# Patient Record
Sex: FEMALE | Race: BLACK OR AFRICAN AMERICAN | NOT HISPANIC OR LATINO | Employment: OTHER | URBAN - METROPOLITAN AREA
[De-identification: names, ages, dates, MRNs, and addresses within clinical notes are randomized per-mention and may not be internally consistent; named-entity substitution may affect disease eponyms.]

---

## 2023-06-01 LAB
ALANINE AMINOTRANSFERASE (SGPT) (U/L) IN SER/PLAS: 12 U/L (ref 7–45)
ALBUMIN (G/DL) IN SER/PLAS: 3.8 G/DL (ref 3.4–5)
ALBUMIN (MG/L) IN URINE: 1709.4 MG/L
ALBUMIN/CREATININE (UG/MG) IN URINE: 2128.8 UG/MG CRT (ref 0–30)
ALKALINE PHOSPHATASE (U/L) IN SER/PLAS: 60 U/L (ref 33–136)
ANION GAP IN SER/PLAS: 11 MMOL/L (ref 10–20)
APPEARANCE, URINE: CLEAR
ASPARTATE AMINOTRANSFERASE (SGOT) (U/L) IN SER/PLAS: 18 U/L (ref 9–39)
BACTERIA, URINE: ABNORMAL /HPF
BASOPHILS (10*3/UL) IN BLOOD BY AUTOMATED COUNT: 0.05 X10E9/L (ref 0–0.1)
BASOPHILS/100 LEUKOCYTES IN BLOOD BY AUTOMATED COUNT: 1.1 % (ref 0–2)
BILIRUBIN TOTAL (MG/DL) IN SER/PLAS: 0.4 MG/DL (ref 0–1.2)
BILIRUBIN, URINE: NEGATIVE
BLOOD, URINE: NEGATIVE
BUDDING YEAST, URINE: PRESENT /HPF
CALCIDIOL (25 OH VITAMIN D3) (NG/ML) IN SER/PLAS: 44 NG/ML
CALCIUM (MG/DL) IN SER/PLAS: 10.1 MG/DL (ref 8.6–10.6)
CARBON DIOXIDE, TOTAL (MMOL/L) IN SER/PLAS: 26 MMOL/L (ref 21–32)
CHLORIDE (MMOL/L) IN SER/PLAS: 107 MMOL/L (ref 98–107)
CHOLESTEROL (MG/DL) IN SER/PLAS: 234 MG/DL (ref 0–199)
CHOLESTEROL IN HDL (MG/DL) IN SER/PLAS: 55.3 MG/DL
CHOLESTEROL/HDL RATIO: 4.2
COLOR, URINE: YELLOW
CREATININE (MG/DL) IN SER/PLAS: 1.46 MG/DL (ref 0.5–1.05)
CREATININE (MG/DL) IN URINE: 80.3 MG/DL (ref 20–320)
EOSINOPHILS (10*3/UL) IN BLOOD BY AUTOMATED COUNT: 0.11 X10E9/L (ref 0–0.4)
EOSINOPHILS/100 LEUKOCYTES IN BLOOD BY AUTOMATED COUNT: 2.4 % (ref 0–6)
ERYTHROCYTE DISTRIBUTION WIDTH (RATIO) BY AUTOMATED COUNT: 13.9 % (ref 11.5–14.5)
ERYTHROCYTE MEAN CORPUSCULAR HEMOGLOBIN CONCENTRATION (G/DL) BY AUTOMATED: 31.6 G/DL (ref 32–36)
ERYTHROCYTE MEAN CORPUSCULAR VOLUME (FL) BY AUTOMATED COUNT: 101 FL (ref 80–100)
ERYTHROCYTES (10*6/UL) IN BLOOD BY AUTOMATED COUNT: 3.87 X10E12/L (ref 4–5.2)
ESTIMATED AVERAGE GLUCOSE FOR HBA1C: 128 MG/DL
GFR FEMALE: 35 ML/MIN/1.73M2
GLUCOSE (MG/DL) IN SER/PLAS: 109 MG/DL (ref 74–99)
GLUCOSE, URINE: NEGATIVE MG/DL
HEMATOCRIT (%) IN BLOOD BY AUTOMATED COUNT: 39.2 % (ref 36–46)
HEMOGLOBIN (G/DL) IN BLOOD: 12.4 G/DL (ref 12–16)
HEMOGLOBIN A1C/HEMOGLOBIN TOTAL IN BLOOD: 6.1 %
IMMATURE GRANULOCYTES/100 LEUKOCYTES IN BLOOD BY AUTOMATED COUNT: 0.2 % (ref 0–0.9)
KETONES, URINE: NEGATIVE MG/DL
LDL: 151 MG/DL (ref 0–99)
LEUKOCYTE ESTERASE, URINE: ABNORMAL
LEUKOCYTES (10*3/UL) IN BLOOD BY AUTOMATED COUNT: 4.5 X10E9/L (ref 4.4–11.3)
LYMPHOCYTES (10*3/UL) IN BLOOD BY AUTOMATED COUNT: 2.18 X10E9/L (ref 0.8–3)
LYMPHOCYTES/100 LEUKOCYTES IN BLOOD BY AUTOMATED COUNT: 48.6 % (ref 13–44)
MONOCYTES (10*3/UL) IN BLOOD BY AUTOMATED COUNT: 0.42 X10E9/L (ref 0.05–0.8)
MONOCYTES/100 LEUKOCYTES IN BLOOD BY AUTOMATED COUNT: 9.4 % (ref 2–10)
MUCUS, URINE: ABNORMAL /LPF
NEUTROPHILS (10*3/UL) IN BLOOD BY AUTOMATED COUNT: 1.72 X10E9/L (ref 1.6–5.5)
NEUTROPHILS/100 LEUKOCYTES IN BLOOD BY AUTOMATED COUNT: 38.3 % (ref 40–80)
NITRITE, URINE: NEGATIVE
NRBC (PER 100 WBCS) BY AUTOMATED COUNT: 0 /100 WBC (ref 0–0)
PH, URINE: 6 (ref 5–8)
PLATELETS (10*3/UL) IN BLOOD AUTOMATED COUNT: 154 X10E9/L (ref 150–450)
POTASSIUM (MMOL/L) IN SER/PLAS: 4.3 MMOL/L (ref 3.5–5.3)
PROTEIN TOTAL: 7.3 G/DL (ref 6.4–8.2)
PROTEIN, URINE: ABNORMAL MG/DL
RBC, URINE: <1 /HPF (ref 0–5)
SODIUM (MMOL/L) IN SER/PLAS: 140 MMOL/L (ref 136–145)
SPECIFIC GRAVITY, URINE: 1.01 (ref 1–1.03)
SQUAMOUS EPITHELIAL CELLS, URINE: 3 /HPF
THYROTROPIN (MIU/L) IN SER/PLAS BY DETECTION LIMIT <= 0.05 MIU/L: 6.92 MIU/L (ref 0.44–3.98)
THYROXINE (T4) FREE (NG/DL) IN SER/PLAS: 1.17 NG/DL (ref 0.78–1.48)
TRIGLYCERIDE (MG/DL) IN SER/PLAS: 138 MG/DL (ref 0–149)
UREA NITROGEN (MG/DL) IN SER/PLAS: 28 MG/DL (ref 6–23)
UROBILINOGEN, URINE: <2 MG/DL (ref 0–1.9)
VLDL: 28 MG/DL (ref 0–40)
WBC, URINE: 6 /HPF (ref 0–5)

## 2023-10-21 RX ORDER — ALLOPURINOL 100 MG/1
150 TABLET ORAL DAILY
Qty: 135 TABLET | Refills: 3 | OUTPATIENT
Start: 2023-10-21

## 2023-10-21 RX ORDER — LEVOTHYROXINE SODIUM 125 UG/1
125 TABLET ORAL DAILY
Qty: 90 TABLET | Refills: 3 | OUTPATIENT
Start: 2023-10-21

## 2023-10-21 RX ORDER — ATENOLOL 25 MG/1
25 TABLET ORAL DAILY
Qty: 90 TABLET | Refills: 3 | OUTPATIENT
Start: 2023-10-21

## 2023-11-03 PROBLEM — Z86.69 HISTORY OF GLAUCOMA: Status: ACTIVE | Noted: 2023-11-03

## 2023-11-03 PROBLEM — R27.9 COORDINATION PROBLEM: Status: ACTIVE | Noted: 2019-09-06

## 2023-11-03 PROBLEM — E66.811 CLASS 1 OBESITY: Status: ACTIVE | Noted: 2022-06-08

## 2023-11-03 PROBLEM — E03.9 HYPOTHYROIDISM: Status: ACTIVE | Noted: 2022-06-20

## 2023-11-03 PROBLEM — Z94.7 HISTORY OF CORNEA TRANSPLANT: Status: ACTIVE | Noted: 2017-09-06

## 2023-11-03 PROBLEM — H40.60X0: Status: ACTIVE | Noted: 2019-01-05

## 2023-11-03 PROBLEM — J30.0 VASOMOTOR RHINITIS: Status: ACTIVE | Noted: 2023-11-03

## 2023-11-03 PROBLEM — M10.9 GOUT: Status: ACTIVE | Noted: 2022-06-20

## 2023-11-03 PROBLEM — K21.9 GASTROESOPHAGEAL REFLUX DISEASE: Status: ACTIVE | Noted: 2022-06-20

## 2023-11-03 PROBLEM — E66.9 CLASS 1 OBESITY: Status: ACTIVE | Noted: 2022-06-08

## 2023-11-03 PROBLEM — J34.2 DEVIATED SEPTUM: Status: ACTIVE | Noted: 2023-11-03

## 2023-11-03 PROBLEM — R04.0 EPISTAXIS, RECURRENT: Status: ACTIVE | Noted: 2023-11-03

## 2023-11-03 PROBLEM — L82.1 OTHER SEBORRHEIC KERATOSIS: Status: ACTIVE | Noted: 2019-06-20

## 2023-11-03 PROBLEM — H35.352 CYSTOID MACULAR EDEMA OF LEFT EYE: Status: ACTIVE | Noted: 2017-09-06

## 2023-11-03 PROBLEM — T38.0X5A: Status: ACTIVE | Noted: 2019-01-05

## 2023-11-03 PROBLEM — I10 HYPERTENSIVE DISORDER: Status: ACTIVE | Noted: 2022-06-20

## 2023-11-03 PROBLEM — N12 PYELONEPHRITIS: Status: ACTIVE | Noted: 2021-07-29

## 2023-11-03 RX ORDER — ASPIRIN 81 MG/1
1 TABLET ORAL DAILY
COMMUNITY
Start: 2016-02-22

## 2023-11-03 RX ORDER — MULTIVITAMIN/IRON/FOLIC ACID 18MG-0.4MG
TABLET ORAL
COMMUNITY
End: 2023-11-06 | Stop reason: WASHOUT

## 2023-11-03 RX ORDER — GABAPENTIN 100 MG/1
CAPSULE ORAL
COMMUNITY
Start: 2023-09-18 | End: 2023-11-16

## 2023-11-03 RX ORDER — ATENOLOL 25 MG/1
TABLET ORAL
COMMUNITY
Start: 2023-08-23 | End: 2023-11-16 | Stop reason: SDUPTHER

## 2023-11-03 RX ORDER — CARBOXYMETHYLCELLULOSE SODIUM 5 MG/ML
SOLUTION/ DROPS OPHTHALMIC
COMMUNITY

## 2023-11-03 RX ORDER — AMLODIPINE BESYLATE 5 MG/1
TABLET ORAL
COMMUNITY
Start: 2022-03-31 | End: 2023-11-16

## 2023-11-03 RX ORDER — PREDNISOLONE ACETATE 10 MG/ML
SUSPENSION/ DROPS OPHTHALMIC
COMMUNITY

## 2023-11-03 RX ORDER — HYDROCHLOROTHIAZIDE 25 MG/1
1 TABLET ORAL DAILY
COMMUNITY
Start: 2016-02-22 | End: 2023-11-06 | Stop reason: WASHOUT

## 2023-11-03 RX ORDER — GLUCOSAM/CHONDRO/HERB 149/HYAL 750-100 MG
TABLET ORAL
COMMUNITY

## 2023-11-03 RX ORDER — ACETAMINOPHEN 500 MG
TABLET ORAL
COMMUNITY
Start: 2022-06-20

## 2023-11-03 RX ORDER — FUROSEMIDE 40 MG/1
TABLET ORAL
COMMUNITY
Start: 2023-09-18 | End: 2023-11-16

## 2023-11-03 RX ORDER — VITAMIN E 268 MG
CAPSULE ORAL
COMMUNITY
Start: 2022-06-23

## 2023-11-03 RX ORDER — ALLOPURINOL 100 MG/1
TABLET ORAL
COMMUNITY
Start: 2016-08-19 | End: 2023-11-16 | Stop reason: SDUPTHER

## 2023-11-03 RX ORDER — LEVOTHYROXINE SODIUM 100 UG/1
CAPSULE ORAL
COMMUNITY
End: 2023-11-16 | Stop reason: SDUPTHER

## 2023-11-03 RX ORDER — TIMOLOL MALEATE 5 MG/ML
SOLUTION/ DROPS OPHTHALMIC
COMMUNITY
Start: 2021-05-28

## 2023-11-03 RX ORDER — ATENOLOL 50 MG/1
1 TABLET ORAL DAILY
COMMUNITY
Start: 2021-05-28 | End: 2023-11-16 | Stop reason: WASHOUT

## 2023-11-03 RX ORDER — CHOLECALCIFEROL (VITAMIN D3) 50 MCG
TABLET ORAL
COMMUNITY
Start: 2021-05-28

## 2023-11-06 ENCOUNTER — OFFICE VISIT (OUTPATIENT)
Dept: PRIMARY CARE | Facility: CLINIC | Age: 83
End: 2023-11-06
Payer: MEDICARE

## 2023-11-06 VITALS
WEIGHT: 224.8 LBS | DIASTOLIC BLOOD PRESSURE: 80 MMHG | OXYGEN SATURATION: 98 % | BODY MASS INDEX: 33.2 KG/M2 | HEART RATE: 59 BPM | SYSTOLIC BLOOD PRESSURE: 140 MMHG | TEMPERATURE: 97.3 F

## 2023-11-06 DIAGNOSIS — Z23 NEEDS FLU SHOT: ICD-10-CM

## 2023-11-06 DIAGNOSIS — E03.9 HYPOTHYROIDISM, UNSPECIFIED TYPE: ICD-10-CM

## 2023-11-06 DIAGNOSIS — K21.9 GASTROESOPHAGEAL REFLUX DISEASE WITHOUT ESOPHAGITIS: ICD-10-CM

## 2023-11-06 DIAGNOSIS — M10.9 GOUT, UNSPECIFIED CAUSE, UNSPECIFIED CHRONICITY, UNSPECIFIED SITE: ICD-10-CM

## 2023-11-06 DIAGNOSIS — I10 ESSENTIAL (PRIMARY) HYPERTENSION: Primary | ICD-10-CM

## 2023-11-06 DIAGNOSIS — N18.31 STAGE 3A CHRONIC KIDNEY DISEASE (CKD) (MULTI): ICD-10-CM

## 2023-11-06 PROCEDURE — 3079F DIAST BP 80-89 MM HG: CPT | Performed by: INTERNAL MEDICINE

## 2023-11-06 PROCEDURE — 99213 OFFICE O/P EST LOW 20 MIN: CPT | Performed by: INTERNAL MEDICINE

## 2023-11-06 PROCEDURE — G0008 ADMIN INFLUENZA VIRUS VAC: HCPCS | Performed by: INTERNAL MEDICINE

## 2023-11-06 PROCEDURE — 90662 IIV NO PRSV INCREASED AG IM: CPT | Performed by: INTERNAL MEDICINE

## 2023-11-06 PROCEDURE — 3077F SYST BP >= 140 MM HG: CPT | Performed by: INTERNAL MEDICINE

## 2023-11-06 PROCEDURE — 1159F MED LIST DOCD IN RCRD: CPT | Performed by: INTERNAL MEDICINE

## 2023-11-06 NOTE — PROGRESS NOTES
Subjective   Patient ID: Alejandra Nelson is a 83 y.o. female who presents for Follow-up (Going to [facility] - wants checked out).    HPI   Pt is going on a trip to [place].  Wants to make sure that she is ok to travel.  States that she feels well.  Voices no c/o    Review of Systems   Constitutional: Negative.    HENT: Negative.     Eyes: Negative.    Respiratory: Negative.     Cardiovascular: Negative.    Gastrointestinal: Negative.    Endocrine: Negative.    Genitourinary: Negative.    Musculoskeletal: Negative.    Skin: Negative.    Allergic/Immunologic: Negative.    Neurological: Negative.    Hematological: Negative.    Psychiatric/Behavioral: Negative.     All other systems reviewed and are negative.      Objective   /80   Pulse 59   Temp 36.3 °C (97.3 °F)   Wt 102 kg (224 lb 12.8 oz)   SpO2 98%   BMI 33.20 kg/m²     Physical Exam  Constitutional:       Appearance: Normal appearance.   HENT:      Head: Normocephalic and atraumatic.      Right Ear: Tympanic membrane and ear canal normal.      Left Ear: Tympanic membrane and ear canal normal.      Nose: Nose normal.      Mouth/Throat:      Mouth: Mucous membranes are moist.      Pharynx: Oropharynx is clear.   Cardiovascular:      Rate and Rhythm: Normal rate and regular rhythm.      Heart sounds: Normal heart sounds. No murmur heard.  Pulmonary:      Effort: Pulmonary effort is normal.      Breath sounds: Normal breath sounds.   Abdominal:      General: Bowel sounds are normal.      Palpations: Abdomen is soft.   Musculoskeletal:      Cervical back: Neck supple.      Thoracic back: Normal.      Lumbar back: Normal.      Comments: Cervical spine--limited rom   Neurological:      Mental Status: She is alert and oriented to person, place, and time.   Psychiatric:         Attention and Perception: Attention normal.         Mood and Affect: Mood and affect normal.         Assessment/Plan     .prob

## 2023-11-08 PROBLEM — N12 PYELONEPHRITIS: Status: RESOLVED | Noted: 2021-07-29 | Resolved: 2023-11-08

## 2023-11-08 PROBLEM — G62.9 POLYNEUROPATHY: Status: ACTIVE | Noted: 2023-11-08

## 2023-11-08 PROBLEM — M17.0 BILATERAL PRIMARY OSTEOARTHRITIS OF KNEE: Status: ACTIVE | Noted: 2023-11-08

## 2023-11-08 PROBLEM — R73.9 HYPERGLYCEMIA: Status: ACTIVE | Noted: 2023-11-08

## 2023-11-08 PROBLEM — M48.02 SPINAL STENOSIS, CERVICAL REGION: Status: ACTIVE | Noted: 2023-11-08

## 2023-11-08 PROBLEM — N18.31 STAGE 3A CHRONIC KIDNEY DISEASE (CKD) (MULTI): Status: ACTIVE | Noted: 2023-11-08

## 2023-11-08 ASSESSMENT — ENCOUNTER SYMPTOMS
NEUROLOGICAL NEGATIVE: 1
PSYCHIATRIC NEGATIVE: 1
RESPIRATORY NEGATIVE: 1
CONSTITUTIONAL NEGATIVE: 1
ALLERGIC/IMMUNOLOGIC NEGATIVE: 1
ENDOCRINE NEGATIVE: 1
HEMATOLOGIC/LYMPHATIC NEGATIVE: 1
CARDIOVASCULAR NEGATIVE: 1
GASTROINTESTINAL NEGATIVE: 1
EYES NEGATIVE: 1
MUSCULOSKELETAL NEGATIVE: 1

## 2023-11-12 DIAGNOSIS — M10.9 GOUT, UNSPECIFIED CAUSE, UNSPECIFIED CHRONICITY, UNSPECIFIED SITE: Primary | ICD-10-CM

## 2023-11-12 DIAGNOSIS — E03.9 HYPOTHYROIDISM, UNSPECIFIED TYPE: ICD-10-CM

## 2023-11-12 DIAGNOSIS — M48.00 SPINAL STENOSIS, UNSPECIFIED SPINAL REGION: ICD-10-CM

## 2023-11-12 DIAGNOSIS — I10 ESSENTIAL (PRIMARY) HYPERTENSION: ICD-10-CM

## 2023-11-16 RX ORDER — LEVOTHYROXINE SODIUM 125 UG/1
125 TABLET ORAL
COMMUNITY
Start: 2023-11-16 | End: 2023-11-16 | Stop reason: SDUPTHER

## 2023-11-16 RX ORDER — FUROSEMIDE 40 MG/1
40 TABLET ORAL DAILY
Qty: 100 TABLET | Refills: 1 | Status: SHIPPED | OUTPATIENT
Start: 2023-11-16 | End: 2024-04-17

## 2023-11-16 RX ORDER — LEVOTHYROXINE SODIUM 125 UG/1
125 TABLET ORAL
Qty: 100 TABLET | Refills: 1 | Status: SHIPPED | OUTPATIENT
Start: 2023-11-16 | End: 2024-04-17 | Stop reason: SDUPTHER

## 2023-11-16 RX ORDER — GABAPENTIN 100 MG/1
CAPSULE ORAL
Qty: 700 CAPSULE | Refills: 1 | Status: SHIPPED | OUTPATIENT
Start: 2023-11-16 | End: 2024-04-17 | Stop reason: SDUPTHER

## 2023-11-16 RX ORDER — ATENOLOL 25 MG/1
25 TABLET ORAL DAILY
Qty: 100 TABLET | Refills: 1 | Status: SHIPPED | OUTPATIENT
Start: 2023-11-16 | End: 2024-04-17

## 2023-11-16 RX ORDER — AMLODIPINE BESYLATE 5 MG/1
5 TABLET ORAL DAILY
Qty: 100 TABLET | Refills: 1 | Status: SHIPPED | OUTPATIENT
Start: 2023-11-16 | End: 2024-04-17

## 2023-11-16 RX ORDER — ALLOPURINOL 100 MG/1
150 TABLET ORAL DAILY
Qty: 150 TABLET | Refills: 1 | Status: SHIPPED | OUTPATIENT
Start: 2023-11-16 | End: 2024-04-17

## 2024-02-28 ENCOUNTER — OFFICE VISIT (OUTPATIENT)
Dept: PRIMARY CARE | Facility: CLINIC | Age: 84
End: 2024-02-28
Payer: MEDICARE

## 2024-02-28 VITALS
HEART RATE: 88 BPM | SYSTOLIC BLOOD PRESSURE: 132 MMHG | WEIGHT: 229 LBS | DIASTOLIC BLOOD PRESSURE: 82 MMHG | TEMPERATURE: 97.2 F | OXYGEN SATURATION: 98 % | HEIGHT: 69 IN | BODY MASS INDEX: 33.92 KG/M2

## 2024-02-28 DIAGNOSIS — R10.9 RIGHT FLANK PAIN: Primary | ICD-10-CM

## 2024-02-28 PROCEDURE — 3075F SYST BP GE 130 - 139MM HG: CPT | Performed by: INTERNAL MEDICINE

## 2024-02-28 PROCEDURE — 3079F DIAST BP 80-89 MM HG: CPT | Performed by: INTERNAL MEDICINE

## 2024-02-28 PROCEDURE — 99213 OFFICE O/P EST LOW 20 MIN: CPT | Performed by: INTERNAL MEDICINE

## 2024-02-28 PROCEDURE — 1159F MED LIST DOCD IN RCRD: CPT | Performed by: INTERNAL MEDICINE

## 2024-02-28 NOTE — PROGRESS NOTES
Primary Care Physician: Jhony De La Rosa MD    Date of Visit: 02/28/2024     Chief Complaint:   Chief Complaint   Patient presents with    Pain     RIGHT SIDE PAIN X 2 WEEKS        Subjective:   Alejandra Nelson is a 83 y.o. female presents     HPI:  HPI  Right side/ flank pain for the past 2 weeks.  Denies fall or injury.  Has not been doing any heavy cleaning or lifting  No urinary sx's  Hx of shingles, but this pain is different.  Has not had a rash.    Past Medical History:  Past Medical History:   Diagnosis Date    Disorder of thyroid, unspecified     Thyroid trouble    Personal history of diseases of the blood and blood-forming organs and certain disorders involving the immune mechanism     History of bleeding disorder    Personal history of other diseases of the nervous system and sense organs 05/28/2021    History of glaucoma        Social History:        Family History:  family history includes Kidney disease in her brother and mother.      Allergies:  Allergies   Allergen Reactions    Penicillins Hives     Hives    Sulfa (Sulfonamide Antibiotics) Rash, Anaphylaxis and Hives    Brimonidine Unknown    [employer] (Bulk) Unknown    Ketorolac Unknown    Naproxen Hives     Hives    Nitrofurantoin Hives    Nitrofurantoin Macrocrystal Unknown       Outpatient Medications:  Current Outpatient Medications   Medication Instructions    acetaminophen (Tylenol) 500 mg tablet Take 500 mg by mouth once daily as needed for pain.    allopurinol (ZYLOPRIM) 150 mg, oral, Daily    alpha tocopherol (Vitamin E) 268 mg (400 unit) capsule take by Oral route  every day    amLODIPine (NORVASC) 5 mg, oral, Daily    aspirin 81 mg EC tablet 1 tablet, oral, Daily    atenolol (TENORMIN) 25 mg, oral, Daily    cholecalciferol (Vitamin D-3) 50 MCG (2000 UT) tablet Vitamin D3 50 MCG (2000 UT) Oral Tablet   Refills: 0        Start : 28-May-2021   Active    furosemide (LASIX) 40 mg, oral, Daily    gabapentin (Neurontin) 100 mg capsule  "TAKE 2 CAPSULES BY MOUTH IN THE MORNING 2 CAPSULES BY MOUTH IN THE AFTERNOON AND 3 CAPSULES BY MOUTH AT BEDTIME    levothyroxine (SYNTHROID, LEVOXYL) 125 mcg, oral, Daily before breakfast    lubricating eye drops (carboxymethylcellulose PF) ophthalmic solution     MULTIVITAMIN ORAL     omega 3-dha-epa-fish oil (Fish OiL) 1,000 mg (120 mg-180 mg) capsule     prednisoLONE acetate (Pred-Forte) 1 % ophthalmic suspension instill 1 drop by ophthalmic route 2 times every day into affected eye(s)    timolol (Timoptic) 0.5 % ophthalmic solution instill 1 drop by ophthalmic route 2 times every day into affected eye(s)    ZINC ORAL          ROS:   Review of Systems   As above    Vitals:  /82 (BP Location: Right arm, Patient Position: Sitting, BP Cuff Size: Adult)   Pulse 88   Temp 36.2 °C (97.2 °F)   Ht 1.753 m (5' 9\")   Wt 104 kg (229 lb)   SpO2 98%   BMI 33.82 kg/m²   Wt Readings from Last 2 Encounters:   02/28/24 104 kg (229 lb)   11/06/23 102 kg (224 lb 12.8 oz)       Physical Exam:  Physical Exam  Vitals reviewed.   Constitutional:       Appearance: Normal appearance.   HENT:      Head: Normocephalic and atraumatic.   Cardiovascular:      Rate and Rhythm: Normal rate and regular rhythm.      Heart sounds: Normal heart sounds, S1 normal and S2 normal. No murmur heard.  Pulmonary:      Effort: Pulmonary effort is normal.      Breath sounds: Normal breath sounds. No wheezing, rhonchi or rales.   Abdominal:      General: Bowel sounds are normal.      Palpations: Abdomen is soft. There is no hepatomegaly, splenomegaly or mass.      Tenderness: There is abdominal tenderness in the right upper quadrant and right lower quadrant.      Comments: +Right flank pain.   Neurological:      Mental Status: She is alert and oriented to person, place, and time.               Assessment/Plan   Diagnoses and all orders for this visit:  Right flank pain  -     US renal complete; Future        Orders:  No orders of the defined types " were placed in this encounter.       Followup Appts:  No future appointments.        ____________________________________________________________  Jhony De La Rosa MD

## 2024-03-11 ENCOUNTER — HOSPITAL ENCOUNTER (OUTPATIENT)
Dept: RADIOLOGY | Facility: CLINIC | Age: 84
Discharge: HOME | End: 2024-03-11
Payer: MEDICARE

## 2024-03-11 DIAGNOSIS — R10.9 RIGHT FLANK PAIN: ICD-10-CM

## 2024-03-11 PROCEDURE — 76770 US EXAM ABDO BACK WALL COMP: CPT | Performed by: RADIOLOGY

## 2024-03-11 PROCEDURE — 76770 US EXAM ABDO BACK WALL COMP: CPT

## 2024-03-25 ENCOUNTER — TELEPHONE (OUTPATIENT)
Dept: PRIMARY CARE | Facility: CLINIC | Age: 84
End: 2024-03-25
Payer: MEDICARE

## 2024-03-27 ENCOUNTER — TELEMEDICINE (OUTPATIENT)
Dept: PRIMARY CARE | Facility: CLINIC | Age: 84
End: 2024-03-27
Payer: MEDICARE

## 2024-03-27 DIAGNOSIS — J06.9 UPPER RESPIRATORY TRACT INFECTION, UNSPECIFIED TYPE: ICD-10-CM

## 2024-03-27 DIAGNOSIS — U07.1 COVID-19: ICD-10-CM

## 2024-03-27 DIAGNOSIS — R05.9 COUGH, UNSPECIFIED TYPE: Primary | ICD-10-CM

## 2024-03-27 LAB
POC BINAX EXPIRATION: ABNORMAL
POC BINAX NOW COVID SERIAL NUMBER: ABNORMAL
POC SARS-COV-2 AG BINAX: ABNORMAL

## 2024-03-27 PROCEDURE — 1159F MED LIST DOCD IN RCRD: CPT | Performed by: INTERNAL MEDICINE

## 2024-03-27 PROCEDURE — 87811 SARS-COV-2 COVID19 W/OPTIC: CPT | Performed by: INTERNAL MEDICINE

## 2024-03-27 PROCEDURE — 99213 OFFICE O/P EST LOW 20 MIN: CPT | Performed by: INTERNAL MEDICINE

## 2024-03-27 RX ORDER — AZITHROMYCIN 250 MG/1
TABLET, FILM COATED ORAL
Qty: 6 TABLET | Refills: 0 | Status: SHIPPED | OUTPATIENT
Start: 2024-03-27 | End: 2024-04-01

## 2024-03-27 ASSESSMENT — ENCOUNTER SYMPTOMS
COUGH: 1
RHINORRHEA: 1

## 2024-03-27 NOTE — PROGRESS NOTES
Primary Care Physician: Jhony De La Rosa MD    Date of Visit: 03/27/2024     Virtual or Telephone Consent    An interactive audio and video telecommunication system which permits real time communications between the patient (at the originating site) and provider (at the distant site) was utilized to provide this telehealth service.   Verbal consent was requested and obtained for pt on this date, 03/28/24, for a telehealth visit.      Chief Complaint:   No chief complaint on file.      Subjective:   Alejandra Nelson is a 83 y.o. female presents     HPI:  HPI  Fri she took her son to [facility].  Was there all day.    Very tired for the next two days.   Went to a [employer] type function last Thursday.   Sat--nose running,  lack of energy  No fever.    +cough--light yellow phlegm.    No postnasal gtt.  No chest congestion   Was told that   Past Medical History:  Past Medical History:   Diagnosis Date    Disorder of thyroid, unspecified     Thyroid trouble    Personal history of diseases of the blood and blood-forming organs and certain disorders involving the immune mechanism     History of bleeding disorder    Personal history of other diseases of the nervous system and sense organs 05/28/2021    History of glaucoma        Social History:        Family History:  family history includes Kidney disease in her brother and mother.      Allergies:  Allergies   Allergen Reactions    Penicillins Hives     Hives    Sulfa (Sulfonamide Antibiotics) Rash, Anaphylaxis and Hives    Brimonidine Unknown    Cream [employer] No.78 (Bulk) Unknown    Ketorolac Unknown    Naproxen Hives     Hives    Nitrofurantoin Hives    Nitrofurantoin Macrocrystal Unknown       Outpatient Medications:  Current Outpatient Medications   Medication Instructions    acetaminophen (Tylenol) 500 mg tablet Take 500 mg by mouth once daily as needed for pain.    allopurinol (ZYLOPRIM) 150 mg, oral, Daily    alpha tocopherol (Vitamin E) 268 mg (400 unit) capsule take by Oral  route  every day    amLODIPine (NORVASC) 5 mg, oral, Daily    aspirin 81 mg EC tablet 1 tablet, oral, Daily    atenolol (TENORMIN) 25 mg, oral, Daily    azithromycin (Zithromax) 250 mg tablet Take 2 tablets (500 mg) by mouth once daily for 1 day, THEN 1 tablet (250 mg) once daily for 4 days. Take 2 tabs (500 mg) by mouth today, than 1 daily for 4 days..    cholecalciferol (Vitamin D-3) 50 MCG (2000 UT) tablet Vitamin D3 50 MCG (2000 UT) Oral Tablet   Refills: 0        Start : 28-May-2021   Active    furosemide (LASIX) 40 mg, oral, Daily    gabapentin (Neurontin) 100 mg capsule TAKE 2 CAPSULES BY MOUTH IN THE MORNING 2 CAPSULES BY MOUTH IN THE AFTERNOON AND 3 CAPSULES BY MOUTH AT BEDTIME    levothyroxine (SYNTHROID, LEVOXYL) 125 mcg, oral, Daily before breakfast    lubricating eye drops (carboxymethylcellulose PF) ophthalmic solution     MULTIVITAMIN ORAL     nirmatrelvir-ritonavir (Paxlovid) 300 mg (150 mg x 2)-100 mg tablet therapy pack 3 tablets, oral, 2 times daily, Follow the instructions on the package    omega 3-dha-epa-fish oil (Fish OiL) 1,000 mg (120 mg-180 mg) capsule     prednisoLONE acetate (Pred-Forte) 1 % ophthalmic suspension instill 1 drop by ophthalmic route 2 times every day into affected eye(s)    timolol (Timoptic) 0.5 % ophthalmic solution instill 1 drop by ophthalmic route 2 times every day into affected eye(s)    ZINC ORAL          ROS:   Review of Systems   HENT:  Positive for rhinorrhea.    Respiratory:  Positive for cough.         Vitals:  There were no vitals taken for this visit.  Wt Readings from Last 2 Encounters:   02/28/24 104 kg (229 lb)   11/06/23 102 kg (224 lb 12.8 oz)       Physical Exam:  Physical Exam      No exam.  Telehealth.     Assessment/Plan   Diagnoses and all orders for this visit:  Cough, unspecified type  -     POCT BinaxNOW Covid-19 Ag Card manually resulted  Upper respiratory tract infection, unspecified type  -     azithromycin (Zithromax) 250 mg tablet; Take 2  tablets (500 mg) by mouth once daily for 1 day, THEN 1 tablet (250 mg) once daily for 4 days. Take 2 tabs (500 mg) by mouth today, than 1 daily for 4 days..  COVID-19  -     nirmatrelvir-ritonavir (Paxlovid) 300 mg (150 mg x 2)-100 mg tablet therapy pack; Take 3 tablets by mouth 2 times a day for 5 days. Follow the instructions on the package        Orders:  Orders Placed This Encounter   Procedures    POCT BinaxNOW Covid-19 Ag Card manually resulted        Followup Appts:  No future appointments.      15 mins  ____________________________________________________________  Jhony De La Rosa MD

## 2024-03-28 ENCOUNTER — TELEPHONE (OUTPATIENT)
Dept: PRIMARY CARE | Facility: CLINIC | Age: 84
End: 2024-03-28
Payer: MEDICARE

## 2024-03-28 DIAGNOSIS — U07.1 COVID-19: ICD-10-CM

## 2024-03-28 RX ORDER — NIRMATRELVIR AND RITONAVIR 300-100 MG
3 KIT ORAL 2 TIMES DAILY
Qty: 30 TABLET | Refills: 0 | Status: SHIPPED | OUTPATIENT
Start: 2024-03-28 | End: 2024-04-02

## 2024-03-28 RX ORDER — NIRMATRELVIR AND RITONAVIR 300-100 MG
3 KIT ORAL 2 TIMES DAILY
Qty: 30 TABLET | Refills: 0 | Status: SHIPPED | OUTPATIENT
Start: 2024-03-28 | End: 2024-03-28 | Stop reason: SDUPTHER

## 2024-03-28 NOTE — TELEPHONE ENCOUNTER
Called patient to follow up with Covid test results and inform Dr. De La Rosa has sent an additional prescription to her pharmacy. Reviewed instructions with patient and encouraged her to get plenty of rest and fluids.

## 2024-04-01 ENCOUNTER — TELEPHONE (OUTPATIENT)
Dept: PRIMARY CARE | Facility: CLINIC | Age: 84
End: 2024-04-01

## 2024-04-01 NOTE — TELEPHONE ENCOUNTER
Pt finishced zithromax yesterday and Paxlovid tomorrow. Feels better but husky voice. 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

## 2024-04-03 ENCOUNTER — TELEMEDICINE (OUTPATIENT)
Dept: PRIMARY CARE | Facility: CLINIC | Age: 84
End: 2024-04-03
Payer: MEDICARE

## 2024-04-03 DIAGNOSIS — J06.9 UPPER RESPIRATORY TRACT INFECTION, UNSPECIFIED TYPE: Primary | ICD-10-CM

## 2024-04-03 DIAGNOSIS — U07.1 COVID-19: ICD-10-CM

## 2024-04-03 PROCEDURE — 1159F MED LIST DOCD IN RCRD: CPT | Performed by: INTERNAL MEDICINE

## 2024-04-03 PROCEDURE — 99213 OFFICE O/P EST LOW 20 MIN: CPT | Performed by: INTERNAL MEDICINE

## 2024-04-03 NOTE — PROGRESS NOTES
Primary Care Physician: Jhony De La Rosa MD    Date of Visit: 04/03/2024     Virtual or Telephone Consent    A telephone visit (audio only) between the patient (at the originating site) and the provider (at the distant site) was utilized to provide this telehealth service.   Verbal consent was requested and obtained from Alejandra Nelson on this date, 04/04/24 for a telehealth visit.      Chief Complaint:   No chief complaint on file.      Subjective:   Alejandra Nelson is a 83 y.o. female presents     HPI:  HPI  Still with runny nose and occas cough  +hoarse voice--occasionally  Just completed paxlovid yesterday   No fever, chills, body aches.    Past Medical History:  Past Medical History:   Diagnosis Date    Disorder of thyroid, unspecified     Thyroid trouble    Personal history of diseases of the blood and blood-forming organs and certain disorders involving the immune mechanism     History of bleeding disorder    Personal history of other diseases of the nervous system and sense organs 05/28/2021    History of glaucoma        Social History:        Family History:  family history includes Kidney disease in her brother and mother.      Allergies:  Allergies   Allergen Reactions    Penicillins Hives     Hives    Sulfa (Sulfonamide Antibiotics) Rash, Anaphylaxis and Hives    Brimonidine Unknown    Cream Base No.78 (Bulk) Unknown    Ketorolac Unknown    Naproxen Hives     Hives    Nitrofurantoin Hives    Nitrofurantoin Macrocrystal Unknown       Outpatient Medications:  Current Outpatient Medications   Medication Instructions    acetaminophen (Tylenol) 500 mg tablet Take 500 mg by mouth once daily as needed for pain.    allopurinol (ZYLOPRIM) 150 mg, oral, Daily    alpha tocopherol (Vitamin E) 268 mg (400 unit) capsule take by Oral route  every day    amLODIPine (NORVASC) 5 mg, oral, Daily    aspirin 81 mg EC tablet 1 tablet, oral, Daily    atenolol (TENORMIN) 25 mg, oral, Daily    cholecalciferol (Vitamin D-3) 50  MCG (2000 UT) tablet Vitamin D3 50 MCG (2000 UT) Oral Tablet   Refills: 0        Start : 28-May-2021   Active    furosemide (LASIX) 40 mg, oral, Daily    gabapentin (Neurontin) 100 mg capsule TAKE 2 CAPSULES BY MOUTH IN THE MORNING 2 CAPSULES BY MOUTH IN THE AFTERNOON AND 3 CAPSULES BY MOUTH AT BEDTIME    levothyroxine (SYNTHROID, LEVOXYL) 125 mcg, oral, Daily before breakfast    lubricating eye drops (carboxymethylcellulose PF) ophthalmic solution     MULTIVITAMIN ORAL     omega 3-dha-epa-fish oil (Fish OiL) 1,000 mg (120 mg-180 mg) capsule     prednisoLONE acetate (Pred-Forte) 1 % ophthalmic suspension instill 1 drop by ophthalmic route 2 times every day into affected eye(s)    timolol (Timoptic) 0.5 % ophthalmic solution instill 1 drop by ophthalmic route 2 times every day into affected eye(s)    ZINC ORAL          ROS:   Review of Systems   HENT:  Positive for rhinorrhea and voice change (hoarse).    Respiratory:  Positive for cough.         Vitals:  There were no vitals taken for this visit.  Wt Readings from Last 2 Encounters:   02/28/24 104 kg (229 lb)   11/06/23 102 kg (224 lb 12.8 oz)       Physical Exam:  Physical Exam   No exam.  Telehealth.       Assessment/Plan   Diagnoses and all orders for this visit:  Upper respiratory tract infection, unspecified type  COVID-19  Comments:  treated    reassurance  No need for abx    Orders:  No orders of the defined types were placed in this encounter.       Followup Appts:  No future appointments.      10 mins  ____________________________________________________________  Jhony De La Rosa MD

## 2024-04-04 ASSESSMENT — ENCOUNTER SYMPTOMS
COUGH: 1
VOICE CHANGE: 1
RHINORRHEA: 1

## 2024-04-06 DIAGNOSIS — E03.9 HYPOTHYROIDISM, UNSPECIFIED TYPE: ICD-10-CM

## 2024-04-09 RX ORDER — LEVOTHYROXINE SODIUM 125 UG/1
125 TABLET ORAL
Qty: 100 TABLET | Refills: 2 | OUTPATIENT
Start: 2024-04-09

## 2024-04-11 DIAGNOSIS — M48.00 SPINAL STENOSIS, UNSPECIFIED SPINAL REGION: ICD-10-CM

## 2024-04-11 DIAGNOSIS — E03.9 HYPOTHYROIDISM, UNSPECIFIED TYPE: ICD-10-CM

## 2024-04-11 DIAGNOSIS — M10.9 GOUT, UNSPECIFIED CAUSE, UNSPECIFIED CHRONICITY, UNSPECIFIED SITE: ICD-10-CM

## 2024-04-11 DIAGNOSIS — I10 ESSENTIAL (PRIMARY) HYPERTENSION: ICD-10-CM

## 2024-04-17 RX ORDER — AMLODIPINE BESYLATE 5 MG/1
5 TABLET ORAL DAILY
Qty: 100 TABLET | Refills: 1 | Status: SHIPPED | OUTPATIENT
Start: 2024-04-17

## 2024-04-17 RX ORDER — GABAPENTIN 100 MG/1
CAPSULE ORAL
Qty: 700 CAPSULE | Refills: 1 | Status: SHIPPED | OUTPATIENT
Start: 2024-04-17

## 2024-04-17 RX ORDER — FUROSEMIDE 40 MG/1
40 TABLET ORAL DAILY
Qty: 100 TABLET | Refills: 1 | Status: SHIPPED | OUTPATIENT
Start: 2024-04-17

## 2024-04-17 RX ORDER — ATENOLOL 25 MG/1
25 TABLET ORAL DAILY
Qty: 100 TABLET | Refills: 1 | Status: SHIPPED | OUTPATIENT
Start: 2024-04-17

## 2024-04-17 RX ORDER — LEVOTHYROXINE SODIUM 125 UG/1
125 TABLET ORAL
Qty: 100 TABLET | Refills: 1 | Status: SHIPPED | OUTPATIENT
Start: 2024-04-17 | End: 2024-11-03

## 2024-04-17 RX ORDER — ALLOPURINOL 100 MG/1
150 TABLET ORAL DAILY
Qty: 150 TABLET | Refills: 1 | Status: SHIPPED | OUTPATIENT
Start: 2024-04-17

## 2024-07-12 ENCOUNTER — OFFICE VISIT (OUTPATIENT)
Dept: PRIMARY CARE | Facility: CLINIC | Age: 84
End: 2024-07-12
Payer: MEDICARE

## 2024-07-12 VITALS
WEIGHT: 233 LBS | SYSTOLIC BLOOD PRESSURE: 136 MMHG | TEMPERATURE: 96.7 F | RESPIRATION RATE: 16 BRPM | HEART RATE: 60 BPM | BODY MASS INDEX: 34.41 KG/M2 | DIASTOLIC BLOOD PRESSURE: 73 MMHG

## 2024-07-12 DIAGNOSIS — Z74.09 PROLONGED IMMOBILIZATION: ICD-10-CM

## 2024-07-12 DIAGNOSIS — N18.31 STAGE 3A CHRONIC KIDNEY DISEASE (CKD) (MULTI): ICD-10-CM

## 2024-07-12 DIAGNOSIS — M79.605 PAIN IN LEFT LEG: ICD-10-CM

## 2024-07-12 DIAGNOSIS — R73.9 HYPERGLYCEMIA: ICD-10-CM

## 2024-07-12 DIAGNOSIS — I10 ESSENTIAL (PRIMARY) HYPERTENSION: Primary | ICD-10-CM

## 2024-07-12 DIAGNOSIS — E03.9 HYPOTHYROIDISM, UNSPECIFIED TYPE: ICD-10-CM

## 2024-07-12 DIAGNOSIS — M10.9 GOUT, UNSPECIFIED CAUSE, UNSPECIFIED CHRONICITY, UNSPECIFIED SITE: ICD-10-CM

## 2024-07-12 DIAGNOSIS — M48.00 SPINAL STENOSIS, UNSPECIFIED SPINAL REGION: ICD-10-CM

## 2024-07-12 DIAGNOSIS — I42.8 OTHER CARDIOMYOPATHIES (MULTI): ICD-10-CM

## 2024-07-12 PROCEDURE — 1036F TOBACCO NON-USER: CPT | Performed by: FAMILY MEDICINE

## 2024-07-12 PROCEDURE — 99214 OFFICE O/P EST MOD 30 MIN: CPT | Performed by: FAMILY MEDICINE

## 2024-07-12 PROCEDURE — 3078F DIAST BP <80 MM HG: CPT | Performed by: FAMILY MEDICINE

## 2024-07-12 PROCEDURE — 3075F SYST BP GE 130 - 139MM HG: CPT | Performed by: FAMILY MEDICINE

## 2024-07-12 PROCEDURE — 1159F MED LIST DOCD IN RCRD: CPT | Performed by: FAMILY MEDICINE

## 2024-07-12 PROCEDURE — 1160F RVW MEDS BY RX/DR IN RCRD: CPT | Performed by: FAMILY MEDICINE

## 2024-07-12 RX ORDER — METHYLPREDNISOLONE 4 MG/1
TABLET ORAL
Qty: 21 TABLET | Refills: 0 | Status: SHIPPED | OUTPATIENT
Start: 2024-07-12 | End: 2024-07-19

## 2024-07-12 RX ORDER — GABAPENTIN 100 MG/1
CAPSULE ORAL
Qty: 700 CAPSULE | Refills: 1 | Status: SHIPPED | OUTPATIENT
Start: 2024-07-12

## 2024-07-12 ASSESSMENT — ENCOUNTER SYMPTOMS
OCCASIONAL FEELINGS OF UNSTEADINESS: 0
LOSS OF SENSATION IN FEET: 0
DEPRESSION: 0
LEG PAIN: 1

## 2024-07-12 NOTE — PATIENT INSTRUCTIONS
GET LEFT LEG ULTRASOUND  KEEP LEGS ELEVATED.    GET LABS DONE.    RETURN IN 1-2 WEEKS FOR RECHECK OF SYMPTOMS.    MEDROL STERPOID FOR PAIN CONTROL.    KEEP ON LASIX TO DECREASE FLUID ON YOUR LEGS.    MAY INCREASE YOUR GABAPENTIN BY 1 EXTRA PILL PER DOSE.    USE [employer].   CALL FOR NEEDS [phone].   KEEP ON MEDICATIONS  KEEP SPECIALTY APPOINTMENTS.

## 2024-07-12 NOTE — PROGRESS NOTES
"Subjective   Patient ID: Alejandra Nelson is a 84 y.o. female who presents for Leg Pain (Left leg swelling/).  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  STARTED IN HER SLEEP.  ELEVATED ON A PILLOW.    BEARING WT ON LLE TRIGGERED PAIN IN THE LEFT CALF NEAR THE KNEE.    PT WEARS THE KNEE HIGH COMPRESSION HOSE.    ASKS IF ANY RX REMOVES WATER FROM HER BODY?  LASIX.  ONLY PARTIAL RESPONSE NOT EVERY TIME.        Review of Systems   All other systems reviewed and are negative.      Objective   Physical Exam  Vitals and nursing note reviewed.   Constitutional:       Appearance: Normal appearance. She is obese.      Comments: ELDERLY LADY DOES NOT LOOK HER AGE USING CANE.  KNEE HIGH TIGHT COMPRESSION HOSE AND PITTING NO \"MUFFIN TOP\" SKIN INTACT.  ? POPLITEAL FULLNESS.  FAIR PULSES.     HENT:      Head: Normocephalic.      Right Ear: Tympanic membrane, ear canal and external ear normal.      Left Ear: Tympanic membrane, ear canal and external ear normal.      Nose: Nose normal.      Mouth/Throat:      Mouth: Mucous membranes are moist.      Pharynx: Oropharynx is clear.   Eyes:      Conjunctiva/sclera: Conjunctivae normal.      Pupils: Pupils are equal, round, and reactive to light.   Cardiovascular:      Rate and Rhythm: Normal rate and regular rhythm.      Pulses: Normal pulses.      Heart sounds: Normal heart sounds.   Pulmonary:      Effort: Pulmonary effort is normal.      Breath sounds: Normal breath sounds.   Abdominal:      General: Bowel sounds are normal.      Palpations: Abdomen is soft.   Musculoskeletal:         General: Normal range of motion.      Cervical back: Normal range of motion and neck supple.   Skin:     General: Skin is warm and dry.   Neurological:      General: No focal deficit present.      Mental Status: She is oriented to person, place, and time. Mental status is at baseline.   Psychiatric:         Mood and Affect: Mood normal.         Behavior: Behavior normal.    "      Thought Content: Thought content normal.         Judgment: Judgment normal.         Assessment/Plan              .VS

## 2024-07-15 ENCOUNTER — TELEPHONE (OUTPATIENT)
Dept: PRIMARY CARE | Facility: CLINIC | Age: 84
End: 2024-07-15
Payer: MEDICARE

## 2024-07-15 DIAGNOSIS — M10.9 GOUT, UNSPECIFIED CAUSE, UNSPECIFIED CHRONICITY, UNSPECIFIED SITE: ICD-10-CM

## 2024-07-15 RX ORDER — METHYLPREDNISOLONE 4 MG/1
TABLET ORAL
Qty: 21 TABLET | Refills: 0 | Status: SHIPPED | OUTPATIENT
Start: 2024-07-15 | End: 2024-07-22

## 2024-07-15 RX ORDER — METHYLPREDNISOLONE 4 MG/1
TABLET ORAL
Qty: 21 TABLET | Refills: 0 | Status: CANCELLED | OUTPATIENT
Start: 2024-07-15 | End: 2024-07-22

## 2024-07-15 NOTE — TELEPHONE ENCOUNTER
PATIENT ASKING FOR THIS MED TO BE SENT TO HER LOCAL PHARMACY ([facility]) IN CHART IT WAS SENT TO [facility]

## 2024-07-16 NOTE — PROGRESS NOTES
Subjective   Patient ID: Alejandra Nelson is a 84 y.o. female who presents for No chief complaint on file..  HPI    Review of Systems    Objective   Physical Exam    Assessment/Plan              .VS

## 2024-07-29 ENCOUNTER — HOSPITAL ENCOUNTER (OUTPATIENT)
Dept: VASCULAR MEDICINE | Facility: CLINIC | Age: 84
Discharge: HOME | End: 2024-07-29
Payer: MEDICARE

## 2024-07-29 ENCOUNTER — APPOINTMENT (OUTPATIENT)
Dept: VASCULAR MEDICINE | Facility: CLINIC | Age: 84
End: 2024-07-29
Payer: MEDICARE

## 2024-07-29 ENCOUNTER — APPOINTMENT (OUTPATIENT)
Dept: PRIMARY CARE | Facility: CLINIC | Age: 84
End: 2024-07-29
Payer: MEDICARE

## 2024-07-29 DIAGNOSIS — M79.605 PAIN IN LEFT LEG: ICD-10-CM

## 2024-07-29 DIAGNOSIS — R60.0 LOCALIZED EDEMA: ICD-10-CM

## 2024-07-29 DIAGNOSIS — I10 ESSENTIAL (PRIMARY) HYPERTENSION: ICD-10-CM

## 2024-07-29 DIAGNOSIS — E03.9 HYPOTHYROIDISM, UNSPECIFIED TYPE: ICD-10-CM

## 2024-07-29 DIAGNOSIS — Z74.09 PROLONGED IMMOBILIZATION: ICD-10-CM

## 2024-07-29 DIAGNOSIS — R73.9 HYPERGLYCEMIA: ICD-10-CM

## 2024-07-29 DIAGNOSIS — N18.31 STAGE 3A CHRONIC KIDNEY DISEASE (CKD) (MULTI): ICD-10-CM

## 2024-07-29 PROCEDURE — 93971 EXTREMITY STUDY: CPT

## 2024-07-29 PROCEDURE — 93971 EXTREMITY STUDY: CPT | Performed by: STUDENT IN AN ORGANIZED HEALTH CARE EDUCATION/TRAINING PROGRAM

## 2024-07-30 ENCOUNTER — APPOINTMENT (OUTPATIENT)
Dept: VASCULAR MEDICINE | Facility: CLINIC | Age: 84
End: 2024-07-30
Payer: MEDICARE

## 2024-07-30 NOTE — RESULT ENCOUNTER NOTE
RT LEG VEINS LOOK OK, ? SOME QUESTION OF NON-VISUALIZED PORTIONS?    LEFT LOWER EXTREMITY BAKER'S CYST IS BENIGN YET CAN TRIGGER PAIN IN KNEE/LEG.

## 2024-07-31 ENCOUNTER — APPOINTMENT (OUTPATIENT)
Dept: PRIMARY CARE | Facility: CLINIC | Age: 84
End: 2024-07-31
Payer: MEDICARE

## 2024-07-31 VITALS
WEIGHT: 234.4 LBS | BODY MASS INDEX: 34.61 KG/M2 | SYSTOLIC BLOOD PRESSURE: 130 MMHG | OXYGEN SATURATION: 97 % | DIASTOLIC BLOOD PRESSURE: 80 MMHG | TEMPERATURE: 97.8 F | HEART RATE: 51 BPM

## 2024-07-31 DIAGNOSIS — M71.22 BAKER'S CYST OF KNEE, LEFT: Primary | ICD-10-CM

## 2024-07-31 DIAGNOSIS — M79.605 PAIN IN LEFT LEG: ICD-10-CM

## 2024-07-31 PROCEDURE — 99213 OFFICE O/P EST LOW 20 MIN: CPT | Performed by: INTERNAL MEDICINE

## 2024-07-31 PROCEDURE — 3075F SYST BP GE 130 - 139MM HG: CPT | Performed by: INTERNAL MEDICINE

## 2024-07-31 PROCEDURE — 3079F DIAST BP 80-89 MM HG: CPT | Performed by: INTERNAL MEDICINE

## 2024-07-31 PROCEDURE — 1159F MED LIST DOCD IN RCRD: CPT | Performed by: INTERNAL MEDICINE

## 2024-07-31 NOTE — PROGRESS NOTES
Primary Care Physician: Jhony De La Rosa MD    Date of Visit: 07/31/2024     Chief Complaint:   Chief Complaint   Patient presents with    Leg Pain     Follow up to testing       Subjective:   Alejandra Nelson is a 84 y.o. female presents     HPI:  HPI  Left leg pain.    A couple of days.     Pain is worse behind the knee.    Had an us that was neg for dvt, but pos for baker's cyst.    Dwp:  ortho can drain the cyst.  Past Medical History:  Past Medical History:   Diagnosis Date    Disorder of thyroid, unspecified     Thyroid trouble    Personal history of diseases of the blood and blood-forming organs and certain disorders involving the immune mechanism     History of bleeding disorder    Personal history of other diseases of the nervous system and sense organs 05/28/2021    History of glaucoma        Social History:   reports that she has never smoked. She has never used smokeless tobacco.     Family History:  family history includes Kidney disease in her brother and mother.      Allergies:  Allergies   Allergen Reactions    Penicillins Hives     Hives    Sulfa (Sulfonamide Antibiotics) Rash, Anaphylaxis and Hives    Brimonidine Unknown    Cream Base No.78 (Bulk) Unknown    Ketorolac Unknown    Naproxen Hives     Hives    Nitrofurantoin Hives    Nitrofurantoin Macrocrystal Unknown       Outpatient Medications:  Current Outpatient Medications   Medication Instructions    acetaminophen (Tylenol) 500 mg tablet Take 500 mg by mouth once daily as needed for pain.    allopurinol (ZYLOPRIM) 150 mg, oral, Daily    alpha tocopherol (Vitamin E) 268 mg (400 unit) capsule take by Oral route  every day    amLODIPine (NORVASC) 5 mg, oral, Daily    aspirin 81 mg EC tablet 1 tablet, oral, Daily    atenolol (TENORMIN) 25 mg, oral, Daily    cholecalciferol (Vitamin D-3) 50 MCG (2000 UT) tablet Vitamin D3 50 MCG (2000 UT) Oral Tablet   Refills: 0        Start : 28-May-2021   Active    furosemide (LASIX) 40 mg, oral, Daily     gabapentin (Neurontin) 100 mg capsule TAKE 2 CAPSULES BY MOUTH IN THE MORNING 2 CAPSULES BY MOUTH IN THE AFTERNOON AND 3 CAPSULES BY MOUTH AT BEDTIME: 12 JULY 2024 MAY ADD AN ADDITION PILL TO EACH DOSE:  #3/AM, #3PM, & #4/QHS    levothyroxine (SYNTHROID, LEVOXYL) 125 mcg, oral, Daily before breakfast    lubricating eye drops (carboxymethylcellulose PF) ophthalmic solution     MULTIVITAMIN ORAL     omega 3-dha-epa-fish oil (Fish OiL) 1,000 mg (120 mg-180 mg) capsule     prednisoLONE acetate (Pred-Forte) 1 % ophthalmic suspension instill 1 drop by ophthalmic route 2 times every day into affected eye(s)    timolol (Timoptic) 0.5 % ophthalmic solution instill 1 drop by ophthalmic route 2 times every day into affected eye(s)    ZINC ORAL          ROS:   Review of Systems     Vitals:  /80 (BP Location: Left arm, Patient Position: Sitting, BP Cuff Size: Adult)   Pulse 51   Temp 36.6 °C (97.8 °F)   Wt 106 kg (234 lb 6.4 oz)   SpO2 97%   BMI 34.61 kg/m²   Wt Readings from Last 2 Encounters:   07/31/24 106 kg (234 lb 6.4 oz)   07/12/24 106 kg (233 lb)       Physical Exam:  Physical Exam          Assessment/Plan         Orders:  No orders of the defined types were placed in this encounter.       Followup Appts:  No future appointments.      medicare well visit    ____________________________________________________________  Jhony De La Rosa MD

## 2024-08-29 ENCOUNTER — TELEPHONE (OUTPATIENT)
Dept: ORTHOPEDIC SURGERY | Facility: HOSPITAL | Age: 84
End: 2024-08-29
Payer: MEDICARE

## 2024-08-29 NOTE — TELEPHONE ENCOUNTER
Called patient to let her know that DR. Tucker would not drain her Baker's cyst if that is what she needed. She said she thinks that is what she needs.  I told her that there is another doctor that goes to the [place] office that might (Dr. Boston).  However, he doesn't have openings until into October.  She says that she would like to stay with Dr. Tucker and see him first to see what he advises.  She asked if when they drain if it's something that you have to stay overnight for.  I explained that you don't stay overnight, it is typically just done in the office under ultrasound.  She also asked about the office location.  I gave her the address and explained location to the best of my abilities. CT

## 2024-09-03 ENCOUNTER — APPOINTMENT (OUTPATIENT)
Dept: ORTHOPEDIC SURGERY | Facility: CLINIC | Age: 84
End: 2024-09-03
Payer: MEDICARE

## 2024-09-03 ENCOUNTER — HOSPITAL ENCOUNTER (OUTPATIENT)
Dept: RADIOLOGY | Facility: CLINIC | Age: 84
Discharge: HOME | End: 2024-09-03
Payer: MEDICARE

## 2024-09-03 DIAGNOSIS — M71.22 BAKER'S CYST OF KNEE, LEFT: ICD-10-CM

## 2024-09-03 DIAGNOSIS — M25.562 LEFT KNEE PAIN, UNSPECIFIED CHRONICITY: ICD-10-CM

## 2024-09-03 PROCEDURE — 99203 OFFICE O/P NEW LOW 30 MIN: CPT | Performed by: ORTHOPAEDIC SURGERY

## 2024-09-03 PROCEDURE — 73564 X-RAY EXAM KNEE 4 OR MORE: CPT | Mod: LEFT SIDE | Performed by: RADIOLOGY

## 2024-09-03 PROCEDURE — 73564 X-RAY EXAM KNEE 4 OR MORE: CPT | Mod: LT

## 2024-09-03 PROCEDURE — 20610 DRAIN/INJ JOINT/BURSA W/O US: CPT | Performed by: ORTHOPAEDIC SURGERY

## 2024-09-03 RX ORDER — METHYLPREDNISOLONE ACETATE 40 MG/ML
40 INJECTION, SUSPENSION INTRA-ARTICULAR; INTRALESIONAL; INTRAMUSCULAR; SOFT TISSUE
Status: COMPLETED | OUTPATIENT
Start: 2024-09-03 | End: 2024-09-03

## 2024-09-03 RX ORDER — LIDOCAINE HYDROCHLORIDE 10 MG/ML
4 INJECTION INFILTRATION; PERINEURAL
Status: COMPLETED | OUTPATIENT
Start: 2024-09-03 | End: 2024-09-03

## 2024-09-03 NOTE — PROGRESS NOTES
HPI  This is a pleasant 84 y.o. female here today for left knee pain.  Has had a new flareup of some chronic left knee pain pain is diffuse she also had some swelling had a DVT scan that was negative for DVT but did show a Baker's cyst comes to see me today for further treatment options        Past Medical History:   Diagnosis Date    Disorder of thyroid, unspecified     Thyroid trouble    Personal history of diseases of the blood and blood-forming organs and certain disorders involving the immune mechanism     History of bleeding disorder    Personal history of other diseases of the nervous system and sense organs 05/28/2021    History of glaucoma       Past Surgical History:   Procedure Laterality Date    OTHER SURGICAL HISTORY  05/28/2021    Partial hysterectomy    OTHER SURGICAL HISTORY  05/28/2021    Trabeculectomy    OTHER SURGICAL HISTORY  05/28/2021    Eye surgery       Social History     Tobacco Use    Smoking status: Never    Smokeless tobacco: Never           ROS  Reviewed and all pertinent positives mentioned in HPI.      EXAM  Patient is in no acute distress.  They are alert and oriented x3.  They are of normal mood and affect.  They are in no acute distress.  The patient's limb is warm and well-perfused.  They have intact sensation to light touch in all lower extremity dermatomes.  There is a minimal effusion.    The patient's quadriceps and hamstring strength is 5 of 5.    The patient can do a straight leg raise with no radicular pain.  negative lachmans. negative posterior drawer.  There is 2+ patellofemoral crepitus.   The knee is stable to varus and valgus stress at both 0 and 30°.  There is no tenderness along the medial or lateral joint line.  negative McMurrays      IMAGING  Imaging reviewed today reveal no gross fracture or dislocation.  Degenerative changes noted in the in all compartments.  MRI reviewed reveals No MRI for review      ASSESSMENT/PLAN  Patient with left osteoarthritis  Patient  has an osteoarthritis flare of her left knee we provided her with a corticosteroid injection today.  If she does not get significant relief she could follow-up with my partner for potential ultrasound drainage of her Baker's cyst in the next 2 weeks.    Patient ID: Alejandra Nelson is a 84 y.o. female.    L Inj/Asp: L knee on 9/3/2024 11:32 AM  Indications: pain  Details: 22 G needle, anterior approach  Medications: 40 mg methylPREDNISolone acetate 40 mg/mL; 4 mL lidocaine 10 mg/mL (1 %)    Under sterile conditions the left knee was injected with 4cc of lidocaine 40mg DepoMedrol.  The patient tolerated the procedure well.  There were no apparent complications.  Sterile bandage was applied.  Procedure, treatment alternatives, risks and benefits explained, specific risks discussed. Consent was given by the patient. Immediately prior to procedure a time out was called to verify the correct patient, procedure, equipment, support staff and site/side marked as required. Patient was prepped and draped in the usual sterile fashion.           Ayush Tucker MD

## 2024-09-10 PROBLEM — M79.605 PAIN IN LEFT LEG: Status: ACTIVE | Noted: 2024-09-10

## 2024-09-13 ENCOUNTER — HOSPITAL ENCOUNTER (OUTPATIENT)
Dept: RADIOLOGY | Facility: CLINIC | Age: 84
Discharge: HOME | End: 2024-09-13
Payer: MEDICARE

## 2024-09-13 ENCOUNTER — HOSPITAL ENCOUNTER (OUTPATIENT)
Dept: RADIOLOGY | Facility: EXTERNAL LOCATION | Age: 84
Discharge: HOME | End: 2024-09-13

## 2024-09-13 ENCOUNTER — APPOINTMENT (OUTPATIENT)
Dept: ORTHOPEDIC SURGERY | Facility: CLINIC | Age: 84
End: 2024-09-13
Payer: MEDICARE

## 2024-09-13 DIAGNOSIS — M25.551 RIGHT HIP PAIN: ICD-10-CM

## 2024-09-13 DIAGNOSIS — M25.561 RIGHT KNEE PAIN, UNSPECIFIED CHRONICITY: ICD-10-CM

## 2024-09-13 DIAGNOSIS — M17.11 PRIMARY OSTEOARTHRITIS OF RIGHT KNEE: Primary | ICD-10-CM

## 2024-09-13 DIAGNOSIS — M76.01 GLUTEAL TENDINITIS OF RIGHT BUTTOCK: ICD-10-CM

## 2024-09-13 PROCEDURE — 1159F MED LIST DOCD IN RCRD: CPT | Performed by: EMERGENCY MEDICINE

## 2024-09-13 PROCEDURE — 73564 X-RAY EXAM KNEE 4 OR MORE: CPT | Mod: RT

## 2024-09-13 PROCEDURE — 73502 X-RAY EXAM HIP UNI 2-3 VIEWS: CPT | Mod: RT

## 2024-09-13 PROCEDURE — 1036F TOBACCO NON-USER: CPT | Performed by: EMERGENCY MEDICINE

## 2024-09-13 PROCEDURE — 99214 OFFICE O/P EST MOD 30 MIN: CPT | Performed by: EMERGENCY MEDICINE

## 2024-09-13 PROCEDURE — 20611 DRAIN/INJ JOINT/BURSA W/US: CPT | Performed by: EMERGENCY MEDICINE

## 2024-09-13 RX ORDER — LIDOCAINE HYDROCHLORIDE 10 MG/ML
4 INJECTION INFILTRATION; PERINEURAL
Status: COMPLETED | OUTPATIENT
Start: 2024-09-13 | End: 2024-09-13

## 2024-09-13 RX ORDER — METHYLPREDNISOLONE ACETATE 40 MG/ML
80 INJECTION, SUSPENSION INTRA-ARTICULAR; INTRALESIONAL; INTRAMUSCULAR; SOFT TISSUE
Status: COMPLETED | OUTPATIENT
Start: 2024-09-13 | End: 2024-09-13

## 2024-09-13 NOTE — PROGRESS NOTES
Subjective    Patient ID: Alejandra Nelson is a 84 y.o. female.    Chief Complaint: Pain of the Right Knee (NPV R KNEE + R HIP PAIN X /PREV SAW DR. PHILLIP 9/3/24 FOR L KNEE BAKERS CYST/KNEE WAS ASP/INJ THEN AND PT STATES IMPROVEMENT, BUT HAS NOTICED MORE CONCERN FOR THEIR OTHER KNEE AND RIGHT HIP SINCE) and Pain of the Right Hip (NPV R KNEE + R HIP PAIN X /PREV SAW DR. PHILLIP 9/3/24 FOR L KNEE BAKERS CYST/KNEE WAS ASP/INJ THEN AND PT STATES IMPROVEMENT, BUT HAS NOTICED MORE CONCERN FOR THEIR OTHER KNEE AND RIGHT HIP SINCE)     Last Surgery: No surgery found  Last Surgery Date: No surgery found    Alejandra is a very pleasant 84-year-old female who was recently seen by one of my partners on 9/3/2024 for her left knee and is coming in here today with some acute on chronic pain in her right knee and right lateral hip.  She takes Tylenol regularly saying that she takes Tylenol PM at night and then normal Tylenol twice during the day.  She has a history of stage III kidney disease and asked to avoid using NSAIDs.  Since her left knee procedure she has noticed that her right knee is starting to hurt mostly over the medial compartment and she has also noticed that she is having some lateral right hip discomfort but states that she really does not get much pain going down into the right groin area.  She denies any infectious symptoms.  She denies any recent traumatic events or known falls.  She walks with a cane at baseline.  No other complaints or today.        Objective   Right Knee Exam     Muscle Strength   The patient has normal right knee strength.    Tenderness   The patient is experiencing tenderness in the medial joint line.    Range of Motion   Extension:  normal   Flexion:  abnormal     Tests   Rdaha:  Medial - positive Lateral - negative  Varus: negative Valgus: positive  Lachman:  Anterior - negative      Drawer:  Anterior - negative    Posterior - negative  Patellar apprehension: negative    Other   Erythema:  absent  Sensation: normal  Pulse: present  Swelling: none  Effusion: no effusion present      Left Knee Exam     Muscle Strength   The patient has normal left knee strength.      Right Hip Exam     Tenderness   The patient is experiencing tenderness in the greater trochanter.    Range of Motion   External rotation:  abnormal   Internal rotation:  abnormal     Muscle Strength   The patient has normal right hip strength.    Tests   APPLE: positive    Other   Erythema: absent  Sensation: normal  Pulse: present    Comments:  Tender to palpation over the right gluteal tendon sheath insertion near the greater trochanter.            Image Results:  X-rays of the right hip and right knee were reviewed and interpreted by me and she does have degenerative changes in both joints but her arthritis seems to be most severe in the medial compartment of the right knee.  She has bone spurring.  No evidence of acute injuries or fractures.    Patient ID: Alejandra Nelson is a 84 y.o. female.    L Inj/Asp: R knee on 9/13/2024 11:23 AM  Indications: pain  Details: 22 G needle, ultrasound-guided superolateral approach  Medications: 80 mg methylPREDNISolone acetate 40 mg/mL; 4 mL lidocaine 10 mg/mL (1 %)  Outcome: tolerated well, no immediate complications  Procedure, treatment alternatives, risks and benefits explained, specific risks discussed. Consent was given by the patient. Immediately prior to procedure a time out was called to verify the correct patient, procedure, equipment, support staff and site/side marked as required. Patient was prepped and draped in the usual sterile fashion.           Assessment/Plan   Encounter Diagnoses:  Primary osteoarthritis of right knee    Right hip pain    Right knee pain, unspecified chronicity    Gluteal tendinitis of right buttock    Orders Placed This Encounter    L Inj/Asp    XR hip right with pelvis when performed 2 or 3 views    XR knee right 4+ views    Point of Care Ultrasound    Referral  to Physical Therapy     No follow-ups on file.    We had a long discussion about her treatment options and ultimately decided to perform a right knee cortisone injection under ultrasound guidance here today in the office.  The patient was provided with an order for physical therapy to help with her gait instability.  She is going to begin using prescription dose Voltaren topically 3 times daily and is also going to continue using her Tylenol during the day and at night.  She will then follow-up with me in about 4 weeks to determine her response to this plan and if she is not feeling better we could potentially do gel injections.  As for the right hip, I think that this is most likely secondary to her knee pain causing her to limp which has led to some gluteal tendinitis.  She does have some mild osteoarthritis in the right hip joint but her exam is more consistent with gluteal tendinitis.  She is going to do Voltaren over the affected area and PT and in 4 weeks if she is still having symptoms we could potentially perform an ultrasound-guided cortisone injection in her right gluteal tendon sheath.    ** Please excuse any errors in grammar or translation related to this dictation. Voice recognition software was utilized to prepare this document. **       Donn Boston MD  [facility]

## 2024-09-22 DIAGNOSIS — M10.9 GOUT, UNSPECIFIED CAUSE, UNSPECIFIED CHRONICITY, UNSPECIFIED SITE: ICD-10-CM

## 2024-09-22 DIAGNOSIS — E03.9 HYPOTHYROIDISM, UNSPECIFIED TYPE: ICD-10-CM

## 2024-09-22 DIAGNOSIS — I10 ESSENTIAL (PRIMARY) HYPERTENSION: ICD-10-CM

## 2024-09-24 RX ORDER — AMLODIPINE BESYLATE 5 MG/1
5 TABLET ORAL DAILY
Qty: 100 TABLET | Refills: 1 | Status: SHIPPED | OUTPATIENT
Start: 2024-09-24

## 2024-09-24 RX ORDER — ATENOLOL 25 MG/1
25 TABLET ORAL DAILY
Qty: 100 TABLET | Refills: 1 | Status: SHIPPED | OUTPATIENT
Start: 2024-09-24

## 2024-09-24 RX ORDER — FUROSEMIDE 40 MG/1
40 TABLET ORAL DAILY
Qty: 100 TABLET | Refills: 1 | Status: SHIPPED | OUTPATIENT
Start: 2024-09-24

## 2024-09-24 RX ORDER — ALLOPURINOL 100 MG/1
150 TABLET ORAL DAILY
Qty: 150 TABLET | Refills: 1 | Status: SHIPPED | OUTPATIENT
Start: 2024-09-24

## 2024-09-24 RX ORDER — LEVOTHYROXINE SODIUM 125 UG/1
TABLET ORAL
Qty: 100 TABLET | Refills: 1 | Status: SHIPPED | OUTPATIENT
Start: 2024-09-24

## 2024-10-11 ENCOUNTER — APPOINTMENT (OUTPATIENT)
Dept: ORTHOPEDIC SURGERY | Facility: CLINIC | Age: 84
End: 2024-10-11
Payer: MEDICARE

## 2024-10-14 ENCOUNTER — EVALUATION (OUTPATIENT)
Dept: PHYSICAL THERAPY | Facility: CLINIC | Age: 84
End: 2024-10-14
Payer: MEDICARE

## 2024-10-14 DIAGNOSIS — M17.11 PRIMARY OSTEOARTHRITIS OF RIGHT KNEE: ICD-10-CM

## 2024-10-14 DIAGNOSIS — M76.01 GLUTEAL TENDINITIS OF RIGHT BUTTOCK: Primary | ICD-10-CM

## 2024-10-14 DIAGNOSIS — M17.12 PRIMARY OSTEOARTHRITIS OF LEFT KNEE: ICD-10-CM

## 2024-10-14 PROCEDURE — 97161 PT EVAL LOW COMPLEX 20 MIN: CPT | Mod: GP | Performed by: PHYSICAL THERAPIST

## 2024-10-14 PROCEDURE — 97110 THERAPEUTIC EXERCISES: CPT | Mod: GP | Performed by: PHYSICAL THERAPIST

## 2024-10-14 ASSESSMENT — PATIENT HEALTH QUESTIONNAIRE - PHQ9
2. FEELING DOWN, DEPRESSED OR HOPELESS: NOT AT ALL
1. LITTLE INTEREST OR PLEASURE IN DOING THINGS: NOT AT ALL
SUM OF ALL RESPONSES TO PHQ9 QUESTIONS 1 AND 2: 0

## 2024-10-14 ASSESSMENT — PAIN SCALES - GENERAL: PAINLEVEL_OUTOF10: 3

## 2024-10-14 ASSESSMENT — ENCOUNTER SYMPTOMS
DEPRESSION: 0
LOSS OF SENSATION IN FEET: 0
OCCASIONAL FEELINGS OF UNSTEADINESS: 0

## 2024-10-14 ASSESSMENT — PAIN - FUNCTIONAL ASSESSMENT: PAIN_FUNCTIONAL_ASSESSMENT: 0-10

## 2024-10-14 NOTE — PROGRESS NOTES
"Physical Therapy    Physical Therapy Lower Extremity Evaluation    Patient Name: Alejandra Nelson  MRN: [id number]  Today's Date: 10/14/2024  Time Calculation  Start Time: 1618  Stop Time: 1703  Time Calculation (min): 45 min  PT Evaluation Time Entry  PT Evaluation (Low) Time Entry: 35  PT Therapeutic Procedures Time Entry  Therapeutic Exercise Time Entry: 10                 Payor: [employer] MEDICARE / Plan: [employer] MEDICARE / Product Type: *No Product type* /     Reason for Referral: 10/10  General Comment: Visit 1 (AUTH)    Current Problem  Problem List Items Addressed This Visit             ICD-10-CM    Gluteal tendinitis of right buttock - Primary M76.01    Relevant Orders    Follow Up In Physical Therapy    Primary osteoarthritis of right knee M17.11    Relevant Orders    Follow Up In Physical Therapy     Other Visit Diagnoses         Codes    Primary osteoarthritis of left knee     M17.12    Relevant Orders    Follow Up In Physical Therapy             Precautions  Precautions  Precautions Comment: None       Pain  Pain Assessment: 0-10  0-10 (Numeric) Pain Score: 3  Pain at worst: 10/10    SUBJECTIVE:   Pain location: R buttock and R knee   left knee and is coming in here today with some acute on chronic pain in her right knee and right lateral hip   L Inj/Asp: R knee on 9/13/2024     Pain is intermittent    Onset: \"just started lately\" for no known reason    Hx of spinal sx at [facility] status post C3-5 decompression with a C3-6 fusion on 6/16/2022     Reviewed medical hx form     Imaging:  XR 9/13/24    FINDINGS:  No acute fracture or malalignment.  Bilateral hip joint spaces are well preserved.  Mild bilateral hip osteoarthrosis with acetabular osteophytes.  Soft tissues are within normal limits.      IMPRESSION:  1. Mild bilateral hip osteoarthrosis with osteophytosis.    FINDINGS:  No acute fracture or malalignment.      Severe medial compartment osteoarthrosis with joint space loss " and  osteophytes. Mild lateral and patellofemoral compartment  osteoarthrosis with osteophytes.      No significant knee joint effusion.  Chondrocalcinosis which can be associated with CPPD arthropathy.      IMPRESSION:  1. Severe medial and mild lateral/patellofemoral compartment  osteoarthrosis.    Prior level of function:   Previously independent with all activity     Uses rollator inside home   Cane for community distances    Functional limitations:  Sometimes rising, walking   Dressing    Home setup:  Lives alone in home   1 step to enter with handrail     Work:  Not employed    Patient stated goal:  Improved pain and function     Prior tx:  None recently     OBJECTIVE:    Lower Extremity ROM: (WNL unless documented below) (p=pain)   ROM in Degrees  RIGHT LEFT   Hip ER Mod loss Mod loss   Hip IR Mod loss  Mod loss   Knee Extension 5 5   Knee Flexion 112 113     Lower Extremity STRENGTH: (WNL unless documented below) (p=pain)   MMT 5/5 max  RIGHT LEFT   Hip Flexion 3+ 3+   Hip Extension 4- 4-   Hip Abduction 4+ 4+   Hip Adduction 4+ 4+   Hip ER 4 4   Hip IR 4 4   Knee Extension 4 4   Knee Flexion 4+ 4+   Ankle DF 4 4   Ankle PF 4 4     Lower Extremity FLEXIBILITY: (WNL unless documented below) (p=pain)  Flexibility  RIGHT LEFT   Quad NT NT   Hamstring  40  36   Hip flexor      Piriformis  Mod loss Mod loss    ITB      Gastroc  Significant loss Significant loss   Soleus  Significant loss Significant loss      Balance: NT   Joint mobility: good patellar mobility   Gait mechanics: amb with cane on R side   Palpation: TTP in R glute, bilateral calves due to tightness without red flags.       Outcome Measure:  Other Measures  Lower Extremity Funtional Score (LEFS): 22/80    TREATMENT:  Initial evaluation completed. Issued and reviewed HEP with pt that included:  Access Code: [email or web address]  URL: [email or web address]  Date: 10/14/2024  Prepared by: Nicolasa Santiago    Exercises  - Seated Calf Stretch  with Strap  - 2 x daily - 7 x weekly - 2 reps - 30 seconds hold  - Seated Knee Extension with Resistance  - 1 x daily - 7 x weekly - 2-3 sets - 10 reps  - Seated Hip Abduction with Resistance  - 1 x daily - 7 x weekly - 2-3 sets - 10 reps  - Seated March with Resistance  - 1 x daily - 7 x weekly - 2-3 sets - 10 reps  - Beginner Bridge  - 1 x daily - 7 x weekly - 2-3 sets - 10 reps    ASSESSEMENT  The pt presents with signs and symptoms consistent with the physical therapy diagnosis of R gluteal pain and R knee pain. Also notes some L knee pain as well.   Pt demonstrates decreased ROM and strength in BLE. She also had TTP in R gluteal region and bilateral calves. She has significant loss of flexibility in bilateral gastrocsoleus. The pt will benefit from skilled physical therapy to reduce impairments in order to return to prior level of function, reduce pain, increase strength and ROM and restore gait/balance.     The physical therapy prognosis is good for the patient to achieve their goals.   The pt tolerated therapy treatment today well with no adverse effects.    Personal factors/barriers to learning that impact therapy include:  Chronicity  Clinical presentation: Stable and/or uncomplicated characteristics  Level of clinical decision making is Low    PLAN  The pt will be seen 1-2 time(s) a week for 6-8 weeks.      The pt has been educated about the risks and benefits of physical therapy including manual therapy treatments and gives consent for treatment.     The patient will benefit from physical therapy treatment to include: therapeutic exercises, therapeutic activities, neurological re-education, manual therapy, modalities, and a home exercise program.     Goals:  Active       PT Problem       Reduce pain at worst to 2-3/10 with all functional and recreational activity.        Start:  10/14/24    Expected End:  01/12/25            Increase ROM/flexibility to WFL to perform daily functional activities including  bed mobility, transfers, gait mechanics       Start:  10/14/24    Expected End:  01/12/25            Increase by > or = 1/2 mm grade to improve gait mechanics, transfers, stair negotiation to perform daily tasks without increased pain/compensation         Start:  10/14/24    Expected End:  01/12/25            Pt to score an increase of 10 points or > on LEFS to display overall increased function.         Start:  10/14/24    Expected End:  01/12/25            Patient will demonstrate independence in home program for support of progression       Start:  10/14/24    Expected End:  01/12/25

## 2024-10-30 ENCOUNTER — TREATMENT (OUTPATIENT)
Dept: PHYSICAL THERAPY | Facility: CLINIC | Age: 84
End: 2024-10-30
Payer: MEDICARE

## 2024-10-30 DIAGNOSIS — M17.11 PRIMARY OSTEOARTHRITIS OF RIGHT KNEE: ICD-10-CM

## 2024-10-30 DIAGNOSIS — M76.01 GLUTEAL TENDINITIS OF RIGHT BUTTOCK: Primary | ICD-10-CM

## 2024-10-30 DIAGNOSIS — M17.12 PRIMARY OSTEOARTHRITIS OF LEFT KNEE: ICD-10-CM

## 2024-10-30 PROCEDURE — 97110 THERAPEUTIC EXERCISES: CPT | Mod: GP,CQ

## 2024-10-30 ASSESSMENT — PAIN - FUNCTIONAL ASSESSMENT: PAIN_FUNCTIONAL_ASSESSMENT: 0-10

## 2024-11-04 NOTE — PROGRESS NOTES
Physical Therapy Treatment    Patient Name: Alejandra Nelson  MRN: [id number]  Today's Date: 11/5/2024  Visit #3  Time Calculation  Start Time: 0917  Stop Time: 1000  Time Calculation (min): 43 min     PT Therapeutic Procedures Time Entry  Manual Therapy Time Entry: 3  Therapeutic Exercise Time Entry: 40                 Payor: [employer] MEDICARE / Plan: [employer] MEDICARE / Product Type: *No Product type* /   Donn Boston  General Comment: Visit 3 of 12 (11/25/24)      Current Problem:  Problem List Items Addressed This Visit             ICD-10-CM    Gluteal tendinitis of right buttock M76.01    Primary osteoarthritis of right knee M17.11     Other Visit Diagnoses         Codes    Primary osteoarthritis of left knee     M17.12            Subjective   General:  Pt reports some increased stiffness today for no known reason. Denies pain just stiffness.   She hasn't had to take Tylenol     HEP Compliance: yes     Patient stated goal: Improved pain and function     Pain:  Pain Assessment: 0-10  0-10 (Numeric) Pain Score: 0 - No pain      Precautions:  Precautions  Precautions Comment: None    Objective   No objective measures taken this visit    Treatment:  Therapeutic exercise  Nustep LE/UE L2 x 5 min   Seated HSS x1 min   Sit to stand from plinth x15 hands on thighs   Fitter board calf stretch x 1 min  Standing HR/TR x10 ea  Lateral walking YTB along railing x2L back and forth    Monster walk YTB along railing x2L back and forth   Standing hip ext, abd, flex x10   Seated hip add 3 sec hold x10   Seated HS curl GTB  2x10  Steps 6 in with UE support x 1 x 10 ea    Neuro-Re ed   AE tandem x 1 min    AE mini march x 1 min     HEP:  Access Code: [email or web address]  URL: [email or web address]  Date: 10/14/2024  Prepared by: Nicolasa Santiago    Exercises  - Seated Calf Stretch with Strap  - 2 x daily - 7 x weekly - 2 reps - 30 seconds hold  - Seated Knee Extension with Resistance  - 1 x daily -  7 x weekly - 2-3 sets - 10 reps  - Seated Hip Abduction with Resistance  - 1 x daily - 7 x weekly - 2-3 sets - 10 reps  - Seated March with Resistance  - 1 x daily - 7 x weekly - 2-3 sets - 10 reps  - Beginner Bridge  - 1 x daily - 7 x weekly - 2-3 sets - 10 reps    Assessment  Pt demonstrated good pacing with exercises today. She did excellent with step ups but expressed concern with balance so needed HR for support. Noted fatigue post session but no pain.     Plan  Continue to progress POC as tolerated by patient to improve strength, mobility and overall function  Add steps next visit           Goals:  Active       PT Problem       Reduce pain at worst to 2-3/10 with all functional and recreational activity.        Start:  10/14/24    Expected End:  01/12/25            Increase ROM/flexibility to WFL to perform daily functional activities including bed mobility, transfers, gait mechanics       Start:  10/14/24    Expected End:  01/12/25            Increase by > or = 1/2 mm grade to improve gait mechanics, transfers, stair negotiation to perform daily tasks without increased pain/compensation         Start:  10/14/24    Expected End:  01/12/25            Pt to score an increase of 10 points or > on LEFS to display overall increased function.         Start:  10/14/24    Expected End:  01/12/25            Patient will demonstrate independence in home program for support of progression       Start:  10/14/24    Expected End:  01/12/25

## 2024-11-05 ENCOUNTER — TREATMENT (OUTPATIENT)
Dept: PHYSICAL THERAPY | Facility: CLINIC | Age: 84
End: 2024-11-05
Payer: MEDICARE

## 2024-11-05 DIAGNOSIS — M76.01 GLUTEAL TENDINITIS OF RIGHT BUTTOCK: ICD-10-CM

## 2024-11-05 DIAGNOSIS — M17.11 PRIMARY OSTEOARTHRITIS OF RIGHT KNEE: ICD-10-CM

## 2024-11-05 DIAGNOSIS — M17.12 PRIMARY OSTEOARTHRITIS OF LEFT KNEE: ICD-10-CM

## 2024-11-05 PROCEDURE — 97110 THERAPEUTIC EXERCISES: CPT | Mod: GP | Performed by: PHYSICAL THERAPIST

## 2024-11-05 ASSESSMENT — PAIN - FUNCTIONAL ASSESSMENT: PAIN_FUNCTIONAL_ASSESSMENT: 0-10

## 2024-11-05 ASSESSMENT — PAIN SCALES - GENERAL: PAINLEVEL_OUTOF10: 0 - NO PAIN

## 2024-11-07 ENCOUNTER — APPOINTMENT (OUTPATIENT)
Dept: PHYSICAL THERAPY | Facility: CLINIC | Age: 84
End: 2024-11-07
Payer: MEDICARE

## 2024-11-12 ENCOUNTER — TREATMENT (OUTPATIENT)
Dept: PHYSICAL THERAPY | Facility: CLINIC | Age: 84
End: 2024-11-12
Payer: MEDICARE

## 2024-11-12 DIAGNOSIS — M76.01 GLUTEAL TENDINITIS OF RIGHT BUTTOCK: ICD-10-CM

## 2024-11-12 DIAGNOSIS — M17.11 PRIMARY OSTEOARTHRITIS OF RIGHT KNEE: ICD-10-CM

## 2024-11-12 DIAGNOSIS — M17.12 PRIMARY OSTEOARTHRITIS OF LEFT KNEE: ICD-10-CM

## 2024-11-12 PROCEDURE — 97110 THERAPEUTIC EXERCISES: CPT | Mod: GP | Performed by: PHYSICAL THERAPIST

## 2024-11-12 ASSESSMENT — PAIN - FUNCTIONAL ASSESSMENT: PAIN_FUNCTIONAL_ASSESSMENT: 0-10

## 2024-11-12 ASSESSMENT — PAIN SCALES - GENERAL: PAINLEVEL_OUTOF10: 0 - NO PAIN

## 2024-11-12 NOTE — PROGRESS NOTES
Physical Therapy Treatment    Patient Name: Alejandra Nelson  MRN: [id number]  Today's Date: 11/12/2024  Visit #4  Time Calculation  Start Time: 1231  Stop Time: 1315  Time Calculation (min): 44 min     PT Therapeutic Procedures Time Entry  Neuromuscular Re-Education Time Entry: 3  Therapeutic Exercise Time Entry: 41                 Payor: UNITED [employer] MEDICARE / Plan: [place] [employer] MEDICARE / Product Type: *No Product type* /   Donn Boston  General Comment: Visit 4 of 12 (11/25/24)      Current Problem:  Problem List Items Addressed This Visit             ICD-10-CM    Gluteal tendinitis of right buttock M76.01    Primary osteoarthritis of right knee M17.11    Primary osteoarthritis of left knee M17.12         Subjective   General:  Pt reports she notices improvement in stiffness in leg when she goes to cross it.   She bought a pair of compression stockings and they have helped with swelling.      HEP Compliance: yes     Patient stated goal: Improved pain and function     Pain:  Pain Assessment: 0-10  0-10 (Numeric) Pain Score: 0 - No pain      Precautions:  Precautions  Precautions Comment: None    Objective   No objective measures taken this visit    Treatment:  Therapeutic exercise  Nustep LE/UE L2 x 5 min   Seated HSS x1 min   Sit to stand from plinth x15 hands on thighs   Fitter board calf stretch x 1 min  Standing HR/TR 1/2 foam 2x10 ea  Lateral walking YTB along railing x1L back and forth    Monster walk YTB along railing x1L back and forth   Standing hip ext, abd, flex YTB x 10   Standing hip add x 10   Seated hip add 3 sec hold x10   Seated HS curl GTB  2x10  Steps 6 in with UE support x 1 x 10 ea    Neuro-Re ed   AE tandem x 1 min    AE mini march x 1 min     HEP:  Access Code: [email or web address]  URL: [email or web address]  Date: 10/14/2024  Prepared by: Nicolasa Santiago    Exercises  - Seated Calf Stretch with Strap  - 2 x daily - 7 x weekly - 2 reps - 30 seconds hold  - Seated  Knee Extension with Resistance  - 1 x daily - 7 x weekly - 2-3 sets - 10 reps  - Seated Hip Abduction with Resistance  - 1 x daily - 7 x weekly - 2-3 sets - 10 reps  - Seated March with Resistance  - 1 x daily - 7 x weekly - 2-3 sets - 10 reps  - Beginner Bridge  - 1 x daily - 7 x weekly - 2-3 sets - 10 reps    Assessment  VCs provided for form. She did excellent with exercise progressions today. Noted some weakness in L HS curls compared to R.     Plan  Continue to progress POC as tolerated by patient to improve strength, mobility and overall function        Goals:  Active       PT Problem       Reduce pain at worst to 2-3/10 with all functional and recreational activity.        Start:  10/14/24    Expected End:  01/12/25            Increase ROM/flexibility to WFL to perform daily functional activities including bed mobility, transfers, gait mechanics       Start:  10/14/24    Expected End:  01/12/25            Increase by > or = 1/2 mm grade to improve gait mechanics, transfers, stair negotiation to perform daily tasks without increased pain/compensation         Start:  10/14/24    Expected End:  01/12/25            Pt to score an increase of 10 points or > on LEFS to display overall increased function.         Start:  10/14/24    Expected End:  01/12/25            Patient will demonstrate independence in home program for support of progression       Start:  10/14/24    Expected End:  01/12/25

## 2024-11-14 ENCOUNTER — APPOINTMENT (OUTPATIENT)
Dept: PHYSICAL THERAPY | Facility: CLINIC | Age: 84
End: 2024-11-14
Payer: MEDICARE

## 2024-11-19 ENCOUNTER — APPOINTMENT (OUTPATIENT)
Dept: PHYSICAL THERAPY | Facility: CLINIC | Age: 84
End: 2024-11-19
Payer: MEDICARE

## 2024-11-20 ENCOUNTER — APPOINTMENT (OUTPATIENT)
Dept: PHYSICAL THERAPY | Facility: CLINIC | Age: 84
End: 2024-11-20
Payer: MEDICARE

## 2024-11-21 ENCOUNTER — APPOINTMENT (OUTPATIENT)
Dept: PHYSICAL THERAPY | Facility: CLINIC | Age: 84
End: 2024-11-21
Payer: MEDICARE

## 2024-11-25 ENCOUNTER — APPOINTMENT (OUTPATIENT)
Dept: PRIMARY CARE | Facility: CLINIC | Age: 84
End: 2024-11-25
Payer: MEDICARE

## 2024-11-26 ENCOUNTER — TREATMENT (OUTPATIENT)
Dept: PHYSICAL THERAPY | Facility: CLINIC | Age: 84
End: 2024-11-26
Payer: MEDICARE

## 2024-11-26 DIAGNOSIS — M17.12 PRIMARY OSTEOARTHRITIS OF LEFT KNEE: ICD-10-CM

## 2024-11-26 DIAGNOSIS — M76.01 GLUTEAL TENDINITIS OF RIGHT BUTTOCK: ICD-10-CM

## 2024-11-26 DIAGNOSIS — M17.11 PRIMARY OSTEOARTHRITIS OF RIGHT KNEE: ICD-10-CM

## 2024-11-26 PROCEDURE — 97110 THERAPEUTIC EXERCISES: CPT | Mod: GP | Performed by: PHYSICAL THERAPIST

## 2024-11-26 ASSESSMENT — PAIN - FUNCTIONAL ASSESSMENT: PAIN_FUNCTIONAL_ASSESSMENT: 0-10

## 2024-11-26 ASSESSMENT — PAIN SCALES - GENERAL: PAINLEVEL_OUTOF10: 0 - NO PAIN

## 2024-11-26 NOTE — PROGRESS NOTES
Physical Therapy Treatment    Patient Name: Alejandra Nelson  MRN: [id number]  Today's Date: 11/26/2024  Visit #5  Time Calculation  Start Time: 1004  Stop Time: 1047  Time Calculation (min): 43 min     PT Therapeutic Procedures Time Entry  Therapeutic Exercise Time Entry: 43                 Payor: [place] [employer]ICARE / Plan: [employer] MEDICARE / Product Type: *No Product type* /   Donn Boston  General Comment: Visit 5 of 12 (11/25/24)      Current Problem:  Problem List Items Addressed This Visit             ICD-10-CM    Gluteal tendinitis of right buttock M76.01    Primary osteoarthritis of right knee M17.11    Primary osteoarthritis of left knee M17.12           Subjective   General:  Pt reports she has been doing okay. She goes to the  on Monday and Friday for warm water therapy   States that her swelling continues to fluctuate. Pain can wake her up some nights     HEP Compliance: yes     Patient stated goal: Improved pain and function     Pain:  Pain Assessment: 0-10  0-10 (Numeric) Pain Score: 0 - No pain      Precautions:  Precautions  Precautions Comment: None    Objective   Lower Extremity ROM: (WNL unless documented below) (p=pain)   ROM in Degrees  RIGHT LEFT   Hip ER Mod loss Mod loss   Hip IR Mod loss  Mod loss   Knee Extension 5 5   Knee Flexion 120 116      Lower Extremity STRENGTH: (WNL unless documented below) (p=pain)   MMT 5/5 max  RIGHT LEFT   Hip Flexion 4+ 4+   Hip Extension 4- 4-   Hip Abduction 4+ 4+   Hip Adduction 4+ 4+   Hip ER 4+ 4+   Hip IR 4+ 4+   Knee Extension 4 4   Knee Flexion 4+ 4+   Ankle DF 4 4   Ankle PF 4 4      Lower Extremity FLEXIBILITY: (WNL unless documented below) (p=pain)  Flexibility  RIGHT LEFT   Quad NT NT   Hamstring  38 38   Hip flexor        Piriformis  Mod loss Mod loss    ITB        Gastroc  Significant loss Significant loss   Soleus  Significant loss Significant loss          Outcome Measure:  Other Measures  Lower Extremity Funtional Score  (LEFS): 24/80       Treatment:  Therapeutic exercise  Nustep LE/UE L2 x 5 min   Recheck performed     Seated HSS x1 min   Sit to stand from plinth x15 hands on thighs   1/2 foam calf stretch x 1 min  Standing HR/TR 1/2 foam 2x10 ea  Steps 6 in with UE support x 1 x 10 ea    Held due to recheck:  Lateral walking YTB along railing x1L back and forth    Monster walk YTB along railing x1L back and forth   Standing hip ext, abd, flex YTB x 10   Standing hip add x 10   Seated hip add 3 sec hold x10   Seated HS curl GTB  2x10      Neuro-Re ed-held due to recheck  AE tandem x 1 min    AE mini march x 1 min     HEP:  Access Code: [email or web address]  URL: [email or web address]  Date: 10/14/2024  Prepared by: Nicolasa Santiago    Exercises  - Seated Calf Stretch with Strap  - 2 x daily - 7 x weekly - 2 reps - 30 seconds hold  - Seated Knee Extension with Resistance  - 1 x daily - 7 x weekly - 2-3 sets - 10 reps  - Seated Hip Abduction with Resistance  - 1 x daily - 7 x weekly - 2-3 sets - 10 reps  - Seated March with Resistance  - 1 x daily - 7 x weekly - 2-3 sets - 10 reps  - Beginner Bridge  - 1 x daily - 7 x weekly - 2-3 sets - 10 reps    Assessment  She demonstrates some improved strength and ROM however still has some flexibility and functional strength deficits.   She will continue to benefit from additional PT to reach functional goals and improve amb    Plan  Continue to progress POC as tolerated by patient to improve strength, mobility and overall function        Goals:  Active       PT Problem       Reduce pain at worst to 2-3/10 with all functional and recreational activity.  (Met)       Start:  10/14/24    Expected End:  01/12/25    Resolved:  11/26/24         Increase ROM/flexibility to WFL to perform daily functional activities including bed mobility, transfers, gait mechanics (Progressing)       Start:  10/14/24    Expected End:  01/12/25            Increase by > or = 1/2 mm grade to improve gait  mechanics, transfers, stair negotiation to perform daily tasks without increased pain/compensation   (Progressing)       Start:  10/14/24    Expected End:  01/12/25            Pt to score an increase of 10 points or > on LEFS to display overall increased function.         Start:  10/14/24    Expected End:  01/12/25            Patient will demonstrate independence in home program for support of progression (Progressing)       Start:  10/14/24    Expected End:  01/12/25         Goal Note       Patient will demonstrate independence in home program for support of progression

## 2024-12-03 ENCOUNTER — APPOINTMENT (OUTPATIENT)
Dept: PRIMARY CARE | Facility: CLINIC | Age: 84
End: 2024-12-03
Payer: MEDICARE

## 2024-12-03 ENCOUNTER — APPOINTMENT (OUTPATIENT)
Dept: PHYSICAL THERAPY | Facility: CLINIC | Age: 84
End: 2024-12-03
Payer: MEDICARE

## 2024-12-03 VITALS
HEIGHT: 69 IN | OXYGEN SATURATION: 96 % | RESPIRATION RATE: 16 BRPM | BODY MASS INDEX: 34.66 KG/M2 | SYSTOLIC BLOOD PRESSURE: 130 MMHG | DIASTOLIC BLOOD PRESSURE: 60 MMHG | HEART RATE: 56 BPM | WEIGHT: 234 LBS

## 2024-12-03 DIAGNOSIS — M10.9 GOUT, UNSPECIFIED CAUSE, UNSPECIFIED CHRONICITY, UNSPECIFIED SITE: ICD-10-CM

## 2024-12-03 DIAGNOSIS — E03.9 HYPOTHYROIDISM, UNSPECIFIED TYPE: ICD-10-CM

## 2024-12-03 DIAGNOSIS — Z00.00 PHYSICAL EXAM: ICD-10-CM

## 2024-12-03 DIAGNOSIS — R73.9 HYPERGLYCEMIA: ICD-10-CM

## 2024-12-03 DIAGNOSIS — I42.8 OTHER CARDIOMYOPATHIES: ICD-10-CM

## 2024-12-03 DIAGNOSIS — E66.811 CLASS 1 OBESITY: ICD-10-CM

## 2024-12-03 DIAGNOSIS — N18.31 STAGE 3A CHRONIC KIDNEY DISEASE (CKD) (MULTI): ICD-10-CM

## 2024-12-03 DIAGNOSIS — N39.0 URINARY TRACT INFECTION WITHOUT HEMATURIA, SITE UNSPECIFIED: ICD-10-CM

## 2024-12-03 DIAGNOSIS — I10 ESSENTIAL (PRIMARY) HYPERTENSION: Primary | ICD-10-CM

## 2024-12-03 DIAGNOSIS — K21.9 GASTROESOPHAGEAL REFLUX DISEASE WITHOUT ESOPHAGITIS: ICD-10-CM

## 2024-12-03 PROCEDURE — G2211 COMPLEX E/M VISIT ADD ON: HCPCS | Performed by: FAMILY MEDICINE

## 2024-12-03 PROCEDURE — 3075F SYST BP GE 130 - 139MM HG: CPT | Performed by: FAMILY MEDICINE

## 2024-12-03 PROCEDURE — 99214 OFFICE O/P EST MOD 30 MIN: CPT | Performed by: FAMILY MEDICINE

## 2024-12-03 PROCEDURE — 1159F MED LIST DOCD IN RCRD: CPT | Performed by: FAMILY MEDICINE

## 2024-12-03 PROCEDURE — 3078F DIAST BP <80 MM HG: CPT | Performed by: FAMILY MEDICINE

## 2024-12-03 PROCEDURE — 1036F TOBACCO NON-USER: CPT | Performed by: FAMILY MEDICINE

## 2024-12-03 NOTE — PROGRESS NOTES
Subjective   Patient ID: Alejandra Nelson is a 84 y.o. female who presents for Hypertension and Knee Pain (BILATERAL KNEE PAIN ).  HPI  bLOOD PRESSURE IS SLIGHTLY ELEVATED TODAY , repeat normal   No signs of gout   Requesting blood testing   [facility] TAKING CARE OF HER MEDICATION   Knee pain , following with ortho , got knee injection   Getting PT   Review of Systems    Past Medical History:   Diagnosis Date    Disorder of thyroid, unspecified     Thyroid trouble    Personal history of diseases of the blood and blood-forming organs and certain disorders involving the immune mechanism     History of bleeding disorder    Personal history of other diseases of the nervous system and sense organs 05/28/2021    History of glaucoma       Past Surgical History:   Procedure Laterality Date    OTHER SURGICAL HISTORY  05/28/2021    Partial hysterectomy    OTHER SURGICAL HISTORY  05/28/2021    Trabeculectomy    OTHER SURGICAL HISTORY  05/28/2021    Eye surgery      Social History     Socioeconomic History    Marital status:    Tobacco Use    Smoking status: Never    Smokeless tobacco: Never      Family History   Problem Relation Name Age of Onset    Kidney disease Mother      Kidney disease Brother         MEDICATIONS AND ALLERGIES:    ALLERGIES Penicillins, Sulfa (sulfonamide antibiotics), Brimonidine, Cream base no.78 (bulk), Ketorolac, Naproxen, Nitrofurantoin, and Nitrofurantoin macrocrystal    MEDICATIONS   Current Outpatient Medications on File Prior to Visit   Medication Sig Dispense Refill    acetaminophen (Tylenol) 500 mg tablet Take 500 mg by mouth once daily as needed for pain.      allopurinol (Zyloprim) 100 mg tablet TAKE 1 AND 1/2 TABLETS BY MOUTH  ONCE DAILY 150 tablet 1    alpha tocopherol (Vitamin E) 268 mg (400 unit) capsule take by Oral route  every day      amLODIPine (Norvasc) 5 mg tablet TAKE 1 TABLET BY MOUTH ONCE  DAILY 100 tablet 1    aspirin 81 mg EC tablet Take 1 tablet (81 mg) by mouth once  "daily.      atenolol (Tenormin) 25 mg tablet TAKE 1 TABLET BY MOUTH ONCE  DAILY 100 tablet 1    cholecalciferol (Vitamin D-3) 50 MCG (2000 UT) tablet Vitamin D3 50 MCG (2000 UT) Oral Tablet   Refills: 0        Start : 28-May-2021   Active      furosemide (Lasix) 40 mg tablet TAKE 1 TABLET BY MOUTH ONCE  DAILY 100 tablet 1    gabapentin (Neurontin) 100 mg capsule TAKE 2 CAPSULES BY MOUTH IN THE MORNING 2 CAPSULES BY MOUTH IN THE AFTERNOON AND 3 CAPSULES BY MOUTH AT BEDTIME: 12 JULY 2024 MAY ADD AN ADDITION PILL TO EACH DOSE:  #3/AM, #3PM, & #4/ capsule 1    levothyroxine (Synthroid, Levoxyl) 125 mcg tablet TAKE 1 TABLET BY MOUTH ONCE  DAILY IN THE MORNING TAKE BEFORE MEAL 100 tablet 1    lubricating eye drops (carboxymethylcellulose PF) ophthalmic solution       MULTIVITAMIN ORAL       omega 3-dha-epa-fish oil (Fish OiL) 1,000 mg (120 mg-180 mg) capsule       prednisoLONE acetate (Pred-Forte) 1 % ophthalmic suspension instill 1 drop by ophthalmic route 2 times every day into affected eye(s)      timolol (Timoptic) 0.5 % ophthalmic solution instill 1 drop by ophthalmic route 2 times every day into affected eye(s)      ZINC ORAL        No current facility-administered medications on file prior to visit.              Objective   Visit Vitals  /66   Pulse 56   Resp 16   Ht 1.753 m (5' 9\")   Wt 106 kg (234 lb)   SpO2 96%   BMI 34.56 kg/m²   Smoking Status Never   BSA 2.27 m²          5/28/2021    11:00 AM 7/7/2021     3:51 PM 11/6/2023    10:54 AM 2/28/2024    11:41 AM 7/12/2024     3:09 PM 7/31/2024    10:11 AM 12/3/2024     2:50 PM   Vitals   Systolic 150 152 140 132 136 130 146   Diastolic 69 75 80 82 73 80 66   BP Location    Right arm Left arm Left arm    Heart Rate 66 52 59 88 60 51 56   Temp 36.8 °C (98.3 °F) 37.1 °C (98.7 °F) 36.3 °C (97.3 °F) 36.2 °C (97.2 °F) 35.9 °C (96.7 °F) 36.6 °C (97.8 °F)    Resp 16 16   16  16   Height 1.753 m (5' 9\") 1.753 m (5' 9\")  1.753 m (5' 9\")   1.753 m (5' 9\") "   Weight (lb) 221 221 224.8 229 233 234.4 234   BMI 32.64 kg/m2 32.64 kg/m2 33.2 kg/m2 33.82 kg/m2 34.41 kg/m2 34.61 kg/m2 34.56 kg/m2   BSA (m2) 2.21 m2 2.21 m2 2.23 m2 2.25 m2 2.27 m2 2.27 m2 2.27 m2   Visit Report   Report Report Report Report Report     Physical Exam  Constitutional: awake; alert, interactive; in no acute distress; well nourished and well developed  ENT: ears and nose were normal in appearance;  Eyes: pupils equal and round  Pulmonary: no respiratory distress and normal respiratory rhythm and effort  Skin: normal skin color and pigmentation;  Psychiatric: oriented to person, place, and time; affect was normal and the mood was normal         Assessment & Plan  Essential (primary) hypertension  BP runnign normal   Requesting blood workup   Will check the labs below   Orders:    CBC and Auto Differential; Future    Urine Culture; Future    Comprehensive Metabolic Panel; Future    Hemoglobin A1C; Future    Lipid Panel; Future    Urinalysis with Reflex Microscopic; Future    TSH with reflex to Free T4 if abnormal; Future    Stage 3a chronic kidney disease (CKD) (Multi)  Avoid nephrotoxic drugs   Will check labs below   Orders:    CBC and Auto Differential; Future    Urine Culture; Future    Comprehensive Metabolic Panel; Future    Hemoglobin A1C; Future    Lipid Panel; Future    Urinalysis with Reflex Microscopic; Future    TSH with reflex to Free T4 if abnormal; Future    Hyperglycemia  Will check sugar and A1C   Orders:    CBC and Auto Differential; Future    Urine Culture; Future    Comprehensive Metabolic Panel; Future    Hemoglobin A1C; Future    Lipid Panel; Future    Urinalysis with Reflex Microscopic; Future    TSH with reflex to Free T4 if abnormal; Future    Gastroesophageal reflux disease without esophagitis  Stable , no acute complaints  Orders:    CBC and Auto Differential; Future    Urine Culture; Future    Comprehensive Metabolic Panel; Future    Hemoglobin A1C; Future    Lipid Panel;  Future    Urinalysis with Reflex Microscopic; Future    TSH with reflex to Free T4 if abnormal; Future    Other cardiomyopathies  Stable , no acute complaints  Orders:    CBC and Auto Differential; Future    Urine Culture; Future    Comprehensive Metabolic Panel; Future    Hemoglobin A1C; Future    Lipid Panel; Future    Urinalysis with Reflex Microscopic; Future    TSH with reflex to Free T4 if abnormal; Future    Class 1 obesity  Life style changes discussed  Orders:    CBC and Auto Differential; Future    Urine Culture; Future    Comprehensive Metabolic Panel; Future    Hemoglobin A1C; Future    Lipid Panel; Future    Urinalysis with Reflex Microscopic; Future    TSH with reflex to Free T4 if abnormal; Future    Hypothyroidism, unspecified type  Will check TSH   Orders:    CBC and Auto Differential; Future    Urine Culture; Future    Comprehensive Metabolic Panel; Future    Hemoglobin A1C; Future    Lipid Panel; Future    Urinalysis with Reflex Microscopic; Future    TSH with reflex to Free T4 if abnormal; Future    Physical exam    Orders:    CBC and Auto Differential; Future    Urine Culture; Future    Comprehensive Metabolic Panel; Future    Hemoglobin A1C; Future    Lipid Panel; Future    Urinalysis with Reflex Microscopic; Future    TSH with reflex to Free T4 if abnormal; Future    Urinary tract infection without hematuria, site unspecified  Will send urine for culture   Orders:    Urine Culture; Future    Gout, unspecified cause, unspecified chronicity, unspecified site  Will check uric acid level   Orders:    Uric acid; Future

## 2024-12-03 NOTE — ASSESSMENT & PLAN NOTE
Will check TSH   Orders:    CBC and Auto Differential; Future    Urine Culture; Future    Comprehensive Metabolic Panel; Future    Hemoglobin A1C; Future    Lipid Panel; Future    Urinalysis with Reflex Microscopic; Future    TSH with reflex to Free T4 if abnormal; Future

## 2024-12-03 NOTE — ASSESSMENT & PLAN NOTE
Will check sugar and A1C   Orders:    CBC and Auto Differential; Future    Urine Culture; Future    Comprehensive Metabolic Panel; Future    Hemoglobin A1C; Future    Lipid Panel; Future    Urinalysis with Reflex Microscopic; Future    TSH with reflex to Free T4 if abnormal; Future

## 2024-12-03 NOTE — ASSESSMENT & PLAN NOTE
Life style changes discussed  Orders:    CBC and Auto Differential; Future    Urine Culture; Future    Comprehensive Metabolic Panel; Future    Hemoglobin A1C; Future    Lipid Panel; Future    Urinalysis with Reflex Microscopic; Future    TSH with reflex to Free T4 if abnormal; Future

## 2024-12-03 NOTE — ASSESSMENT & PLAN NOTE
Avoid nephrotoxic drugs   Will check labs below   Orders:    CBC and Auto Differential; Future    Urine Culture; Future    Comprehensive Metabolic Panel; Future    Hemoglobin A1C; Future    Lipid Panel; Future    Urinalysis with Reflex Microscopic; Future    TSH with reflex to Free T4 if abnormal; Future

## 2024-12-03 NOTE — ASSESSMENT & PLAN NOTE
Stable , no acute complaints  Orders:    CBC and Auto Differential; Future    Urine Culture; Future    Comprehensive Metabolic Panel; Future    Hemoglobin A1C; Future    Lipid Panel; Future    Urinalysis with Reflex Microscopic; Future    TSH with reflex to Free T4 if abnormal; Future

## 2024-12-04 ENCOUNTER — LAB (OUTPATIENT)
Dept: LAB | Facility: LAB | Age: 84
End: 2024-12-04
Payer: MEDICARE

## 2024-12-04 ENCOUNTER — TREATMENT (OUTPATIENT)
Dept: PHYSICAL THERAPY | Facility: CLINIC | Age: 84
End: 2024-12-04
Payer: MEDICARE

## 2024-12-04 DIAGNOSIS — N39.0 URINARY TRACT INFECTION WITHOUT HEMATURIA, SITE UNSPECIFIED: ICD-10-CM

## 2024-12-04 DIAGNOSIS — M10.9 GOUT, UNSPECIFIED CAUSE, UNSPECIFIED CHRONICITY, UNSPECIFIED SITE: ICD-10-CM

## 2024-12-04 DIAGNOSIS — R73.9 HYPERGLYCEMIA: ICD-10-CM

## 2024-12-04 DIAGNOSIS — M76.01 GLUTEAL TENDINITIS OF RIGHT BUTTOCK: Primary | ICD-10-CM

## 2024-12-04 DIAGNOSIS — I10 ESSENTIAL (PRIMARY) HYPERTENSION: ICD-10-CM

## 2024-12-04 DIAGNOSIS — M17.11 PRIMARY OSTEOARTHRITIS OF RIGHT KNEE: ICD-10-CM

## 2024-12-04 DIAGNOSIS — N18.31 STAGE 3A CHRONIC KIDNEY DISEASE (CKD) (MULTI): ICD-10-CM

## 2024-12-04 DIAGNOSIS — K21.9 GASTROESOPHAGEAL REFLUX DISEASE WITHOUT ESOPHAGITIS: ICD-10-CM

## 2024-12-04 DIAGNOSIS — M17.12 PRIMARY OSTEOARTHRITIS OF LEFT KNEE: ICD-10-CM

## 2024-12-04 DIAGNOSIS — Z00.00 PHYSICAL EXAM: ICD-10-CM

## 2024-12-04 DIAGNOSIS — E66.811 CLASS 1 OBESITY: ICD-10-CM

## 2024-12-04 DIAGNOSIS — I42.8 OTHER CARDIOMYOPATHIES: ICD-10-CM

## 2024-12-04 DIAGNOSIS — E03.9 HYPOTHYROIDISM, UNSPECIFIED TYPE: ICD-10-CM

## 2024-12-04 LAB
APPEARANCE UR: CLEAR
BILIRUB UR STRIP.AUTO-MCNC: NEGATIVE MG/DL
COLOR UR: ABNORMAL
GLUCOSE UR STRIP.AUTO-MCNC: NORMAL MG/DL
KETONES UR STRIP.AUTO-MCNC: NEGATIVE MG/DL
LEUKOCYTE ESTERASE UR QL STRIP.AUTO: ABNORMAL
MUCOUS THREADS #/AREA URNS AUTO: ABNORMAL /LPF
NITRITE UR QL STRIP.AUTO: NEGATIVE
PH UR STRIP.AUTO: 7 [PH]
PROT UR STRIP.AUTO-MCNC: ABNORMAL MG/DL
RBC # UR STRIP.AUTO: ABNORMAL /UL
RBC #/AREA URNS AUTO: ABNORMAL /HPF
RENAL EPI CELLS #/AREA UR COMP ASSIST: ABNORMAL /HPF
SP GR UR STRIP.AUTO: 1.01
SQUAMOUS #/AREA URNS AUTO: ABNORMAL /HPF
UROBILINOGEN UR STRIP.AUTO-MCNC: NORMAL MG/DL
WBC #/AREA URNS AUTO: ABNORMAL /HPF

## 2024-12-04 PROCEDURE — 84550 ASSAY OF BLOOD/URIC ACID: CPT

## 2024-12-04 PROCEDURE — 87086 URINE CULTURE/COLONY COUNT: CPT

## 2024-12-04 PROCEDURE — 81001 URINALYSIS AUTO W/SCOPE: CPT

## 2024-12-04 PROCEDURE — 85025 COMPLETE CBC W/AUTO DIFF WBC: CPT

## 2024-12-04 PROCEDURE — 36415 COLL VENOUS BLD VENIPUNCTURE: CPT

## 2024-12-04 PROCEDURE — 84443 ASSAY THYROID STIM HORMONE: CPT

## 2024-12-04 PROCEDURE — 80061 LIPID PANEL: CPT

## 2024-12-04 PROCEDURE — 83036 HEMOGLOBIN GLYCOSYLATED A1C: CPT

## 2024-12-04 PROCEDURE — 97110 THERAPEUTIC EXERCISES: CPT | Mod: GP,CQ

## 2024-12-04 PROCEDURE — 80053 COMPREHEN METABOLIC PANEL: CPT

## 2024-12-04 ASSESSMENT — PAIN SCALES - GENERAL: PAINLEVEL_OUTOF10: 0 - NO PAIN

## 2024-12-04 ASSESSMENT — PAIN - FUNCTIONAL ASSESSMENT: PAIN_FUNCTIONAL_ASSESSMENT: 0-10

## 2024-12-04 NOTE — PROGRESS NOTES
Physical Therapy Treatment    Patient Name: Alejandra Nelson  MRN: [id number]  Today's Date: 12/4/2024  Visit #6  Time Calculation  Start Time: 1133  Stop Time: 1215  Time Calculation (min): 42 min     PT Therapeutic Procedures Time Entry  Neuromuscular Re-Education Time Entry: 3  Therapeutic Exercise Time Entry: 39                 Payor: [employer]ICARE / Plan: [employer]ICARE / Product Type: *No Product type* /   Donn Boston  General Comment: Visit 6 of 12 (11/25/24)      Current Problem:  Problem List Items Addressed This Visit             ICD-10-CM    Gluteal tendinitis of right buttock - Primary M76.01    Primary osteoarthritis of right knee M17.11    Primary osteoarthritis of left knee M17.12       Subjective   General:  Pt reports she has been doing okay. Swelling fluctuates.   Had blood work done this morning.   Pain/cramping has not been waking her up lately.   Did not bring cane to [place] this past weekend and did well, just walked with slower pace.     HEP Compliance: yes     Patient stated goal: Improved pain and function     Pain:  Pain Assessment: 0-10  0-10 (Numeric) Pain Score: 0 - No pain      Precautions:  Precautions  Precautions Comment: None    Objective   No measures today       Treatment:  Therapeutic exercise  Nustep LE/UE L2 x 5 min  Piriformis stretch next visit   Seated HSS x1 min   Sit to stand from plinth x15 hands on thighs   1/2 foam calf stretch x 1 min  Standing HR/TR 1/2 foam 2x10 ea  Steps 6 in with UE support (L) 6'' x10, (R) 6'' x5 4'' x5    Held due to time  Lateral walking YTB along railing x1L back and forth    Monster walk YTB along railing x1L back and forth   Standing hip ext, abd, flex YTB x 10  held   Standing hip add x 10   Seated hip add 3 sec hold x10 h  Seated HS curl GTB  2x10       Neuro-Re ed  AE tandem x 1 min    AE mini march x 1 min 1 UE support    HEP:  Access Code: [email or web address]  URL: [email or web address]  Date:  10/14/2024  Prepared by: Nicolasa Santiago    Exercises  - Seated Calf Stretch with Strap  - 2 x daily - 7 x weekly - 2 reps - 30 seconds hold  - Seated Knee Extension with Resistance  - 1 x daily - 7 x weekly - 2-3 sets - 10 reps  - Seated Hip Abduction with Resistance  - 1 x daily - 7 x weekly - 2-3 sets - 10 reps  - Seated March with Resistance  - 1 x daily - 7 x weekly - 2-3 sets - 10 reps  - Beginner Bridge  - 1 x daily - 7 x weekly - 2-3 sets - 10 reps    Assessment  Reduced step height for R fwd step ups d/t LE weakness/compensation. Pt hesitant to perform mini marches with reduced UE support but able to do so.   Overall good session but pt was fatigued.   Plan  Continue to progress POC as tolerated by patient to improve strength, mobility and overall function        Goals:  Active       PT Problem       Reduce pain at worst to 2-3/10 with all functional and recreational activity.  (Met)       Start:  10/14/24    Expected End:  01/12/25    Resolved:  11/26/24         Increase ROM/flexibility to WFL to perform daily functional activities including bed mobility, transfers, gait mechanics (Progressing)       Start:  10/14/24    Expected End:  01/12/25            Increase by > or = 1/2 mm grade to improve gait mechanics, transfers, stair negotiation to perform daily tasks without increased pain/compensation   (Progressing)       Start:  10/14/24    Expected End:  01/12/25            Pt to score an increase of 10 points or > on LEFS to display overall increased function.         Start:  10/14/24    Expected End:  01/12/25            Patient will demonstrate independence in home program for support of progression (Progressing)       Start:  10/14/24    Expected End:  01/12/25         Goal Note       Patient will demonstrate independence in home program for support of progression

## 2024-12-05 ENCOUNTER — APPOINTMENT (OUTPATIENT)
Dept: PHYSICAL THERAPY | Facility: CLINIC | Age: 84
End: 2024-12-05
Payer: MEDICARE

## 2024-12-05 LAB
ALBUMIN SERPL BCP-MCNC: 3.7 G/DL (ref 3.4–5)
ALP SERPL-CCNC: 61 U/L (ref 33–136)
ALT SERPL W P-5'-P-CCNC: 14 U/L (ref 7–45)
ANION GAP SERPL CALC-SCNC: 12 MMOL/L (ref 10–20)
AST SERPL W P-5'-P-CCNC: 20 U/L (ref 9–39)
BACTERIA UR CULT: NO GROWTH
BASOPHILS # BLD AUTO: 0.05 X10*3/UL (ref 0–0.1)
BASOPHILS NFR BLD AUTO: 1.1 %
BILIRUB SERPL-MCNC: 0.4 MG/DL (ref 0–1.2)
BUN SERPL-MCNC: 31 MG/DL (ref 6–23)
CALCIUM SERPL-MCNC: 9.4 MG/DL (ref 8.6–10.6)
CHLORIDE SERPL-SCNC: 105 MMOL/L (ref 98–107)
CHOLEST SERPL-MCNC: 225 MG/DL (ref 0–199)
CHOLESTEROL/HDL RATIO: 3.9
CO2 SERPL-SCNC: 29 MMOL/L (ref 21–32)
CREAT SERPL-MCNC: 2.52 MG/DL (ref 0.5–1.05)
EGFRCR SERPLBLD CKD-EPI 2021: 18 ML/MIN/1.73M*2
EOSINOPHIL # BLD AUTO: 0.1 X10*3/UL (ref 0–0.4)
EOSINOPHIL NFR BLD AUTO: 2.1 %
ERYTHROCYTE [DISTWIDTH] IN BLOOD BY AUTOMATED COUNT: 13.5 % (ref 11.5–14.5)
EST. AVERAGE GLUCOSE BLD GHB EST-MCNC: 123 MG/DL
GLUCOSE SERPL-MCNC: 104 MG/DL (ref 74–99)
HBA1C MFR BLD: 5.9 %
HCT VFR BLD AUTO: 35.6 % (ref 36–46)
HDLC SERPL-MCNC: 58.4 MG/DL
HGB BLD-MCNC: 11.3 G/DL (ref 12–16)
IMM GRANULOCYTES # BLD AUTO: 0.02 X10*3/UL (ref 0–0.5)
IMM GRANULOCYTES NFR BLD AUTO: 0.4 % (ref 0–0.9)
LDLC SERPL CALC-MCNC: 140 MG/DL
LYMPHOCYTES # BLD AUTO: 1.9 X10*3/UL (ref 0.8–3)
LYMPHOCYTES NFR BLD AUTO: 40.8 %
MCH RBC QN AUTO: 32.6 PG (ref 26–34)
MCHC RBC AUTO-ENTMCNC: 31.7 G/DL (ref 32–36)
MCV RBC AUTO: 103 FL (ref 80–100)
MONOCYTES # BLD AUTO: 0.44 X10*3/UL (ref 0.05–0.8)
MONOCYTES NFR BLD AUTO: 9.4 %
NEUTROPHILS # BLD AUTO: 2.15 X10*3/UL (ref 1.6–5.5)
NEUTROPHILS NFR BLD AUTO: 46.2 %
NON HDL CHOLESTEROL: 167 MG/DL (ref 0–149)
NRBC BLD-RTO: 0 /100 WBCS (ref 0–0)
PLATELET # BLD AUTO: 166 X10*3/UL (ref 150–450)
POTASSIUM SERPL-SCNC: 4.2 MMOL/L (ref 3.5–5.3)
PROT SERPL-MCNC: 7 G/DL (ref 6.4–8.2)
RBC # BLD AUTO: 3.47 X10*6/UL (ref 4–5.2)
SODIUM SERPL-SCNC: 142 MMOL/L (ref 136–145)
TRIGL SERPL-MCNC: 132 MG/DL (ref 0–149)
TSH SERPL-ACNC: 2.92 MIU/L (ref 0.44–3.98)
URATE SERPL-MCNC: 5.8 MG/DL (ref 2.3–6.7)
VLDL: 26 MG/DL (ref 0–40)
WBC # BLD AUTO: 4.7 X10*3/UL (ref 4.4–11.3)

## 2024-12-06 ENCOUNTER — TELEPHONE (OUTPATIENT)
Dept: PRIMARY CARE | Facility: CLINIC | Age: 84
End: 2024-12-06
Payer: MEDICARE

## 2024-12-06 NOTE — TELEPHONE ENCOUNTER
----- Message from Daryl Villela sent at 12/5/2024  3:48 PM EST -----  Kidney function decreased, please make sure not to take any ibuprofen , alevel or naproxen , she needs to see Dr. Estrella PINA , even if for virtual . Please schedule with me if no appointment available , virtual add on is ok.

## 2024-12-11 ENCOUNTER — TREATMENT (OUTPATIENT)
Dept: PHYSICAL THERAPY | Facility: CLINIC | Age: 84
End: 2024-12-11
Payer: MEDICARE

## 2024-12-11 DIAGNOSIS — M76.01 GLUTEAL TENDINITIS OF RIGHT BUTTOCK: ICD-10-CM

## 2024-12-11 DIAGNOSIS — M17.11 PRIMARY OSTEOARTHRITIS OF RIGHT KNEE: ICD-10-CM

## 2024-12-11 DIAGNOSIS — M17.12 PRIMARY OSTEOARTHRITIS OF LEFT KNEE: ICD-10-CM

## 2024-12-11 PROCEDURE — 97110 THERAPEUTIC EXERCISES: CPT | Mod: GP | Performed by: PHYSICAL THERAPIST

## 2024-12-11 ASSESSMENT — PAIN SCALES - GENERAL: PAINLEVEL_OUTOF10: 0 - NO PAIN

## 2024-12-11 ASSESSMENT — PAIN - FUNCTIONAL ASSESSMENT: PAIN_FUNCTIONAL_ASSESSMENT: 0-10

## 2024-12-11 NOTE — PROGRESS NOTES
Physical Therapy Treatment    Patient Name: Alejandra Nelson  MRN: [id number]  Today's Date: 12/11/2024  Visit #7  Time Calculation  Start Time: 1051  Stop Time: 1130  Time Calculation (min): 39 min     PT Therapeutic Procedures Time Entry  Neuromuscular Re-Education Time Entry: 4  Therapeutic Exercise Time Entry: 35                 Payor: [employer]ICARE / Plan: [employer] MEDICARE / Product Type: *No Product type* /   Donn Boston  Reason for Referral: Glute tendinitis, bilateral knee OA  General Comment: Visit 7 (3) 12/24/24      Current Problem:  Problem List Items Addressed This Visit             ICD-10-CM    Gluteal tendinitis of right buttock M76.01    Primary osteoarthritis of right knee M17.11    Primary osteoarthritis of left knee M17.12         Subjective   General:  Pt reports she's doing pretty good but she is a little stiff due to the weather.      HEP Compliance: yes     Patient stated goal: Improved pain and function     Pain:  Pain Assessment: 0-10  0-10 (Numeric) Pain Score: 0 - No pain      Precautions:  Precautions  Precautions Comment: None    Objective   No measures today       Treatment:  Therapeutic exercise  Nustep LE/UE L2 x 5 min  Piriformis stretch next visit   Seated HSS x1 min   Sit to stand from plinth x10 with AE under feet   1/2 foam calf stretch x 1 min  Standing HR/TR 1/2 foam 2x10 ea  Steps 6 in with UE support (L) 6'' x10, (R) 6'' x 10 with BUE support   Lateral walking YTB along railing x1L back and forth    Monster walk YTB along railing x1L back and forth   Standing hip ext, abd, flex YTB x 10      Held:   Standing hip add x 10   Seated hip add 3 sec hold x10 h  Seated HS curl GTB  2x10       Neuro-Re ed  AE tandem x 1 min    AE mini march x 1 min 1 UE support  Sit to stand from plinth x10 with AE under feet     HEP:  Access Code: [email or web address]  URL: [email or web address]  Date: 10/14/2024  Prepared by: Nicolasa Santiago    Exercises  -  Seated Calf Stretch with Strap  - 2 x daily - 7 x weekly - 2 reps - 30 seconds hold  - Seated Knee Extension with Resistance  - 1 x daily - 7 x weekly - 2-3 sets - 10 reps  - Seated Hip Abduction with Resistance  - 1 x daily - 7 x weekly - 2-3 sets - 10 reps  - Seated March with Resistance  - 1 x daily - 7 x weekly - 2-3 sets - 10 reps  - Beginner Bridge  - 1 x daily - 7 x weekly - 2-3 sets - 10 reps    Assessment  Pt did well with resuming standing hip exercises. She was able to get through more exercises today. Great effort overall.     Plan  Continue to progress POC as tolerated by patient to improve strength, mobility and overall function        Goals:  Active       PT Problem       Reduce pain at worst to 2-3/10 with all functional and recreational activity.  (Met)       Start:  10/14/24    Expected End:  01/12/25    Resolved:  11/26/24         Increase ROM/flexibility to WFL to perform daily functional activities including bed mobility, transfers, gait mechanics (Progressing)       Start:  10/14/24    Expected End:  01/12/25            Increase by > or = 1/2 mm grade to improve gait mechanics, transfers, stair negotiation to perform daily tasks without increased pain/compensation   (Progressing)       Start:  10/14/24    Expected End:  01/12/25            Pt to score an increase of 10 points or > on LEFS to display overall increased function.         Start:  10/14/24    Expected End:  01/12/25            Patient will demonstrate independence in home program for support of progression (Progressing)       Start:  10/14/24    Expected End:  01/12/25         Goal Note       Patient will demonstrate independence in home program for support of progression

## 2024-12-18 ENCOUNTER — TREATMENT (OUTPATIENT)
Dept: PHYSICAL THERAPY | Facility: CLINIC | Age: 84
End: 2024-12-18
Payer: MEDICARE

## 2024-12-18 DIAGNOSIS — M17.12 PRIMARY OSTEOARTHRITIS OF LEFT KNEE: ICD-10-CM

## 2024-12-18 DIAGNOSIS — M76.01 GLUTEAL TENDINITIS OF RIGHT BUTTOCK: ICD-10-CM

## 2024-12-18 DIAGNOSIS — M17.11 PRIMARY OSTEOARTHRITIS OF RIGHT KNEE: ICD-10-CM

## 2024-12-18 PROCEDURE — 97110 THERAPEUTIC EXERCISES: CPT | Mod: GP,CQ

## 2024-12-18 ASSESSMENT — PAIN - FUNCTIONAL ASSESSMENT: PAIN_FUNCTIONAL_ASSESSMENT: 0-10

## 2024-12-18 ASSESSMENT — PAIN SCALES - GENERAL: PAINLEVEL_OUTOF10: 0 - NO PAIN

## 2024-12-18 NOTE — PROGRESS NOTES
Physical Therapy Treatment    Patient Name: Alejandra Nelson  MRN: [id number]  Today's Date: 12/18/2024  Visit #8  Time Calculation  Start Time: 1052  Stop Time: 1130  Time Calculation (min): 38 min     PT Therapeutic Procedures Time Entry  Neuromuscular Re-Education Time Entry: 2  Therapeutic Exercise Time Entry: 36                 Payor: [place] [employer] MEDICARE / Plan: UNITED [employer] MEDICARE / Product Type: *No Product type* /   Donn Boston  Reason for Referral: Glute tendinitis, bilateral knee OA  General Comment: Visit 8 (4) 12/24/24      Current Problem:  Problem List Items Addressed This Visit             ICD-10-CM    Gluteal tendinitis of right buttock M76.01    Primary osteoarthritis of right knee M17.11    Primary osteoarthritis of left knee M17.12       Subjective   General:  Pt reports she is feeling more stiffness in knee's vs pain.   Pt arrived late to session.     HEP Compliance: yes     Patient stated goal: Improved pain and function     Pain:  Pain Assessment: 0-10  0-10 (Numeric) Pain Score: 0 - No pain      Precautions:  Precautions  Precautions Comment: None    Objective   No measures today       Treatment:  Therapeutic exercise  Nustep LE/UE L4 x 5 min  Piriformis stretch next visit painful on knee's/ very stiff d/t edema   Seated HSS x1 min   Sit to stand from plinth x15  1/2 foam calf stretch x 1 min  Standing HR/TR 1/2 foam 2x10 ea  Steps 6 in with UE support (L) 6'' x10, (R) 6'' x 10 with BUE support   Lateral walking YTB along railing x1L back and forth    Monster walk YTB along railing x1L back and forth   Standing hip ext, abd, flex YTB x 10    Standing HS curl next visit     Held:   Standing hip add x 10   Seated hip add 3 sec hold x10   Seated HS curl GTB  2x10       Neuro-Re ed  AE tandem x 1 min    AE mini march x 1 min 1 UE support held   Sit to stand from plinth x10 with AE under feet held     HEP:  Access Code: [email or web address]  URL:  [email or web address]  Date: 10/14/2024  Prepared by: Nicolasa Santiago    Exercises  - Seated Calf Stretch with Strap  - 2 x daily - 7 x weekly - 2 reps - 30 seconds hold  - Seated Knee Extension with Resistance  - 1 x daily - 7 x weekly - 2-3 sets - 10 reps  - Seated Hip Abduction with Resistance  - 1 x daily - 7 x weekly - 2-3 sets - 10 reps  - Seated March with Resistance  - 1 x daily - 7 x weekly - 2-3 sets - 10 reps  - Beginner Bridge  - 1 x daily - 7 x weekly - 2-3 sets - 10 reps    Assessment  Required cueing for correct technique with sit to stand.   Did not perform with AE today in order to focus on technique without compensation.   Did not tolerate piriformis stretch well d/t BL knee edema/stiffness.     Plan  Continue to progress POC as tolerated by patient to improve strength, mobility and overall function        Goals:  Active       PT Problem       Reduce pain at worst to 2-3/10 with all functional and recreational activity.  (Met)       Start:  10/14/24    Expected End:  01/12/25    Resolved:  11/26/24         Increase ROM/flexibility to WFL to perform daily functional activities including bed mobility, transfers, gait mechanics (Progressing)       Start:  10/14/24    Expected End:  01/12/25            Increase by > or = 1/2 mm grade to improve gait mechanics, transfers, stair negotiation to perform daily tasks without increased pain/compensation   (Progressing)       Start:  10/14/24    Expected End:  01/12/25            Pt to score an increase of 10 points or > on LEFS to display overall increased function.         Start:  10/14/24    Expected End:  01/12/25            Patient will demonstrate independence in home program for support of progression (Progressing)       Start:  10/14/24    Expected End:  01/12/25         Goal Note       Patient will demonstrate independence in home program for support of progression

## 2024-12-24 ENCOUNTER — TREATMENT (OUTPATIENT)
Dept: PHYSICAL THERAPY | Facility: CLINIC | Age: 84
End: 2024-12-24
Payer: MEDICARE

## 2024-12-24 DIAGNOSIS — M17.12 PRIMARY OSTEOARTHRITIS OF LEFT KNEE: ICD-10-CM

## 2024-12-24 DIAGNOSIS — M17.11 PRIMARY OSTEOARTHRITIS OF RIGHT KNEE: ICD-10-CM

## 2024-12-24 DIAGNOSIS — M76.01 GLUTEAL TENDINITIS OF RIGHT BUTTOCK: ICD-10-CM

## 2024-12-24 PROCEDURE — 97110 THERAPEUTIC EXERCISES: CPT | Mod: GP | Performed by: PHYSICAL THERAPIST

## 2024-12-24 ASSESSMENT — PAIN SCALES - GENERAL: PAINLEVEL_OUTOF10: 0 - NO PAIN

## 2024-12-24 ASSESSMENT — PAIN - FUNCTIONAL ASSESSMENT: PAIN_FUNCTIONAL_ASSESSMENT: 0-10

## 2024-12-24 NOTE — PROGRESS NOTES
Physical Therapy Treatment    Patient Name: Alejandra Nelson  MRN: [id number]  Today's Date: 12/24/2024  Visit #9                          Payor: [employer] MEDICARE / Plan: UNITED HEALTHCARE MEDICARE / Product Type: *No Product type* /   Donn Boston  Reason for Referral: Glute tendinitis, bilateral knee OA  General Comment: Visit 9 (5) 12/24/24      Current Problem:  Problem List Items Addressed This Visit             ICD-10-CM    Gluteal tendinitis of right buttock M76.01    Primary osteoarthritis of right knee M17.11    Primary osteoarthritis of left knee M17.12         Subjective   General:  Pt continues to go to warm water exercise class.   She notes stiffness   She uses her cane when she goes out per pt.   Reports that the swelling limits her mobility   Pt arrived late to session.     HEP Compliance: yes     Patient stated goal: Improved pain and function     Pain:  Pain Assessment: 0-10  0-10 (Numeric) Pain Score: 0 - No pain      Precautions:  Precautions  Precautions Comment: None    Objective   Lower Extremity ROM: (WNL unless documented below) (p=pain)   ROM in Degrees  RIGHT LEFT   Hip ER Mod loss Mod loss   Hip IR Mod loss  Mod loss   Knee Extension 5 5   Knee Flexion 118 114      Lower Extremity STRENGTH: (WNL unless documented below) (p=pain)   MMT 5/5 max  RIGHT LEFT   Hip Flexion 4+ 4+   Hip Extension 4+ 4+   Hip Abduction 4+ 4+   Hip Adduction 5 5   Hip ER 4+ 4+   Hip IR 4+ 4+   Knee Extension 5 5   Knee Flexion 5 5   Ankle DF 4 4   Ankle PF 4 4      Lower Extremity FLEXIBILITY: (WNL unless documented below) (p=pain)  Flexibility  RIGHT LEFT   Quad NT NT   Hamstring  30 35   Hip flexor        Piriformis  Mod loss Mod loss    ITB        Gastroc  mod loss Mod loss   Soleus  Mod loss Mod loss         Gait mechanics: amb with cane on R side        Outcome Measure:  Other Measures  Lower Extremity Funtional Score (LEFS): 22/80 (initial eval)   Other Measures  Lower Extremity Funtional Score  (LEFS): 32/80         Treatment:  Therapeutic exercise  Nustep LE/UE L4 x 5 min  Recheck today   Reviewed updated HEP     HEP:  Access Code: [email or web address]  URL: [email or web address]  Date: 12/24/2024  Prepared by: Nicolasa Santiago    Exercises  - Seated Calf Stretch with Strap  - 2 x daily - 7 x weekly - 2 reps - 30 seconds hold  - Seated Knee Extension with Resistance  - 1 x daily - 7 x weekly - 2-3 sets - 10 reps  - Seated Hip Abduction with Resistance  - 1 x daily - 7 x weekly - 2-3 sets - 10 reps  - Seated March with Resistance  - 1 x daily - 7 x weekly - 2-3 sets - 10 reps  - Beginner Bridge  - 1 x daily - 7 x weekly - 2-3 sets - 10 reps  - Supine Hamstring Stretch  - 1-2 x daily - 7 x weekly - 2 reps - 30 seconds hold  - Supine Heel Slide  - 1-2 x daily - 7 x weekly - 2 sets - 10 reps  - Supine Piriformis Stretch  - 1-2 x daily - 7 x weekly - 2 reps - 30 seconds hold    Assessment  Pts insurance cert expires today. She has had a recert already as a result we discussed doing a trial of independent HEP .  She demonstrates improved strength and ROM. We discussed the importance of stretching and HEP was updated    Plan  Discharge to updated HEP.         Goals:  Active       PT Problem       Reduce pain at worst to 2-3/10 with all functional and recreational activity.  (Met)       Start:  10/14/24    Expected End:  01/12/25    Resolved:  11/26/24         Increase ROM/flexibility to WFL to perform daily functional activities including bed mobility, transfers, gait mechanics (Progressing)       Start:  10/14/24    Expected End:  01/12/25            Increase by > or = 1/2 mm grade to improve gait mechanics, transfers, stair negotiation to perform daily tasks without increased pain/compensation   (Met)       Start:  10/14/24    Expected End:  01/12/25    Resolved:  12/24/24         Pt to score an increase of 10 points or > on LEFS to display overall increased function.   (Met)       Start:  10/14/24     Expected End:  01/12/25    Resolved:  12/24/24         Patient will demonstrate independence in home program for support of progression (Met)       Start:  10/14/24    Expected End:  01/12/25    Resolved:  12/24/24      Goal Note       Patient will demonstrate independence in home program for support of progression

## 2025-01-06 ENCOUNTER — APPOINTMENT (OUTPATIENT)
Dept: PRIMARY CARE | Facility: CLINIC | Age: 85
End: 2025-01-06
Payer: MEDICARE

## 2025-01-06 ENCOUNTER — LAB (OUTPATIENT)
Dept: LAB | Facility: LAB | Age: 85
End: 2025-01-06
Payer: MEDICARE

## 2025-01-06 VITALS
SYSTOLIC BLOOD PRESSURE: 140 MMHG | WEIGHT: 240 LBS | HEART RATE: 63 BPM | DIASTOLIC BLOOD PRESSURE: 90 MMHG | TEMPERATURE: 97.7 F | BODY MASS INDEX: 35.44 KG/M2 | OXYGEN SATURATION: 95 %

## 2025-01-06 DIAGNOSIS — N18.32 STAGE 3B CHRONIC KIDNEY DISEASE (MULTI): ICD-10-CM

## 2025-01-06 DIAGNOSIS — N18.4 CKD (CHRONIC KIDNEY DISEASE) STAGE 4, GFR 15-29 ML/MIN (MULTI): Primary | ICD-10-CM

## 2025-01-06 LAB
ANION GAP SERPL CALC-SCNC: 9 MMOL/L (ref 10–20)
BUN SERPL-MCNC: 35 MG/DL (ref 6–23)
CALCIUM SERPL-MCNC: 9.4 MG/DL (ref 8.6–10.3)
CHLORIDE SERPL-SCNC: 108 MMOL/L (ref 98–107)
CO2 SERPL-SCNC: 30 MMOL/L (ref 21–32)
CREAT SERPL-MCNC: 2.4 MG/DL (ref 0.5–1.05)
EGFRCR SERPLBLD CKD-EPI 2021: 19 ML/MIN/1.73M*2
GLUCOSE SERPL-MCNC: 106 MG/DL (ref 74–99)
POTASSIUM SERPL-SCNC: 4.5 MMOL/L (ref 3.5–5.3)
SODIUM SERPL-SCNC: 142 MMOL/L (ref 136–145)

## 2025-01-06 PROCEDURE — 99213 OFFICE O/P EST LOW 20 MIN: CPT | Performed by: INTERNAL MEDICINE

## 2025-01-06 PROCEDURE — 80048 BASIC METABOLIC PNL TOTAL CA: CPT

## 2025-01-06 PROCEDURE — 3080F DIAST BP >= 90 MM HG: CPT | Performed by: INTERNAL MEDICINE

## 2025-01-06 PROCEDURE — G2211 COMPLEX E/M VISIT ADD ON: HCPCS | Performed by: INTERNAL MEDICINE

## 2025-01-06 PROCEDURE — 3077F SYST BP >= 140 MM HG: CPT | Performed by: INTERNAL MEDICINE

## 2025-01-06 PROCEDURE — 1159F MED LIST DOCD IN RCRD: CPT | Performed by: INTERNAL MEDICINE

## 2025-01-06 NOTE — PROGRESS NOTES
Primary Care Physician: Jhony De La Rosa MD    Date of Visit: 01/06/2025     Chief Complaint:   Chief Complaint   Patient presents with    Follow-up     Last month saw Dr. Villela and her kidney functions were abnormal         HPI:  HPI    Subjective:   Alejandra Nelson is a 84 y.o. female presents   Was seen last month by Dr Villela b/c she wanted blood work done.   Hx of ckd 3b  Lab results show a worsening of renal function to ckd 4.    She was told to stop nsaids.   Here for f/up  Voices no c/o.  No difficulty urinating.    Family hx of severe renal dz:  Grandson on hd,   Mother and 2 brothers were on hd as well.  She also believes her father had kindney dz.    Allergies:  Allergies   Allergen Reactions    Penicillins Hives     Hives    Sulfa (Sulfonamide Antibiotics) Rash, Anaphylaxis and Hives    Brimonidine Unknown    [employer] (Bulk) Unknown    Ketorolac Unknown    Naproxen Hives     Hives    Nitrofurantoin Hives    Nitrofurantoin Macrocrystal Unknown       Outpatient Medications:  Current Outpatient Medications   Medication Instructions    acetaminophen (Tylenol) 500 mg tablet Take 500 mg by mouth once daily as needed for pain.    allopurinol (ZYLOPRIM) 150 mg, oral, Daily    alpha tocopherol (Vitamin E) 268 mg (400 unit) capsule take by Oral route  every day    amLODIPine (NORVASC) 5 mg, oral, Daily    aspirin 81 mg EC tablet 1 tablet, Daily    atenolol (TENORMIN) 25 mg, oral, Daily    cholecalciferol (Vitamin D-3) 50 MCG (2000 UT) tablet Vitamin D3 50 MCG (2000 UT) Oral Tablet   Refills: 0        Start : 28-May-2021   Active    furosemide (LASIX) 40 mg, oral, Daily    gabapentin (Neurontin) 100 mg capsule TAKE 2 CAPSULES BY MOUTH IN THE MORNING 2 CAPSULES BY MOUTH IN THE AFTERNOON AND 3 CAPSULES BY MOUTH AT BEDTIME: 12 JULY 2024 MAY ADD AN ADDITION PILL TO EACH DOSE:  #3/AM, #3PM, & #4/QHS    levothyroxine (Synthroid, Levoxyl) 125 mcg tablet TAKE 1 TABLET BY MOUTH ONCE  DAILY IN THE MORNING TAKE BEFORE  MEAL    lubricating eye drops (carboxymethylcellulose PF) ophthalmic solution     MULTIVITAMIN ORAL     omega 3-dha-epa-fish oil (Fish OiL) 1,000 mg (120 mg-180 mg) capsule     prednisoLONE acetate (Pred-Forte) 1 % ophthalmic suspension instill 1 drop by ophthalmic route 2 times every day into affected eye(s)    timolol (Timoptic) 0.5 % ophthalmic solution instill 1 drop by ophthalmic route 2 times every day into affected eye(s)    ZINC ORAL        ROS:   Review of Systems     Vitals:  /90 (BP Location: Left arm, Patient Position: Sitting, BP Cuff Size: Large adult)   Pulse 63   Temp 36.5 °C (97.7 °F) (Temporal)   Wt 109 kg (240 lb)   SpO2 95%   BMI 35.44 kg/m²   Wt Readings from Last 3 Encounters:   01/06/25 109 kg (240 lb)   12/03/24 106 kg (234 lb)   07/31/24 106 kg (234 lb 6.4 oz)     BP Readings from Last 3 Encounters:   01/06/25 140/90   12/03/24 130/60   07/31/24 130/80        Physical Exam:  Physical Exam  Vitals reviewed.   Constitutional:       Appearance: Normal appearance.   HENT:      Head: Normocephalic and atraumatic.   Cardiovascular:      Rate and Rhythm: Normal rate and regular rhythm.      Heart sounds: Normal heart sounds, S1 normal and S2 normal. No murmur heard.  Pulmonary:      Effort: Pulmonary effort is normal.      Breath sounds: Normal breath sounds. No wheezing, rhonchi or rales.   Abdominal:      General: Bowel sounds are normal.      Palpations: Abdomen is soft.   Skin:     General: Skin is warm and dry.   Neurological:      General: No focal deficit present.      Mental Status: She is alert and oriented to person, place, and time.          Assessment/Plan   Diagnoses and all orders for this visit:  CKD (chronic kidney disease) stage 4, GFR 15-29 ml/min (Multi)  -     Basic metabolic panel; Future  She has been off of nsaids for 1 month.  Repeat renal function off of nsaids.  If not improved back to ckd3b--->renal consult     Orders:  Orders Placed This Encounter   Procedures     Basic metabolic panel        Followup Appts:  Future Appointments   Date Time Provider Department Center   3/5/2025  1:00 PM Jhony De La Rosa MD [id number]        ____________________________________________________________  Jhony De La Rosa MD

## 2025-02-12 DIAGNOSIS — M48.00 SPINAL STENOSIS, UNSPECIFIED SPINAL REGION: ICD-10-CM

## 2025-02-14 DIAGNOSIS — M48.00 SPINAL STENOSIS, UNSPECIFIED SPINAL REGION: ICD-10-CM

## 2025-02-14 RX ORDER — GABAPENTIN 100 MG/1
CAPSULE ORAL
Qty: 400 CAPSULE | Refills: 1 | Status: SHIPPED | OUTPATIENT
Start: 2025-02-14

## 2025-02-17 RX ORDER — GABAPENTIN 100 MG/1
CAPSULE ORAL
Qty: 400 CAPSULE | Refills: 8 | OUTPATIENT
Start: 2025-02-17

## 2025-03-05 ENCOUNTER — APPOINTMENT (OUTPATIENT)
Dept: PRIMARY CARE | Facility: CLINIC | Age: 85
End: 2025-03-05
Payer: MEDICARE

## 2025-03-05 VITALS
RESPIRATION RATE: 16 BRPM | SYSTOLIC BLOOD PRESSURE: 134 MMHG | DIASTOLIC BLOOD PRESSURE: 74 MMHG | BODY MASS INDEX: 34.57 KG/M2 | OXYGEN SATURATION: 97 % | TEMPERATURE: 98.2 F | HEIGHT: 69 IN | HEART RATE: 64 BPM | WEIGHT: 233.4 LBS

## 2025-03-05 DIAGNOSIS — M10.9 GOUT, UNSPECIFIED CAUSE, UNSPECIFIED CHRONICITY, UNSPECIFIED SITE: ICD-10-CM

## 2025-03-05 DIAGNOSIS — M54.10 RADICULOPATHY AFFECTING UPPER EXTREMITY: Primary | ICD-10-CM

## 2025-03-05 DIAGNOSIS — E03.9 HYPOTHYROIDISM, UNSPECIFIED TYPE: ICD-10-CM

## 2025-03-05 DIAGNOSIS — I10 ESSENTIAL (PRIMARY) HYPERTENSION: ICD-10-CM

## 2025-03-05 PROCEDURE — 1170F FXNL STATUS ASSESSED: CPT | Performed by: INTERNAL MEDICINE

## 2025-03-05 PROCEDURE — 1159F MED LIST DOCD IN RCRD: CPT | Performed by: INTERNAL MEDICINE

## 2025-03-05 PROCEDURE — 3075F SYST BP GE 130 - 139MM HG: CPT | Performed by: INTERNAL MEDICINE

## 2025-03-05 PROCEDURE — 1036F TOBACCO NON-USER: CPT | Performed by: INTERNAL MEDICINE

## 2025-03-05 PROCEDURE — 1124F ACP DISCUSS-NO DSCNMKR DOCD: CPT | Performed by: INTERNAL MEDICINE

## 2025-03-05 PROCEDURE — 3078F DIAST BP <80 MM HG: CPT | Performed by: INTERNAL MEDICINE

## 2025-03-05 PROCEDURE — 99213 OFFICE O/P EST LOW 20 MIN: CPT | Performed by: INTERNAL MEDICINE

## 2025-03-05 ASSESSMENT — ACTIVITIES OF DAILY LIVING (ADL)
MANAGING_FINANCES: INDEPENDENT
GROCERY_SHOPPING: INDEPENDENT
BATHING: INDEPENDENT
TAKING_MEDICATION: INDEPENDENT
DOING_HOUSEWORK: INDEPENDENT
DRESSING: INDEPENDENT

## 2025-03-05 ASSESSMENT — PATIENT HEALTH QUESTIONNAIRE - PHQ9
2. FEELING DOWN, DEPRESSED OR HOPELESS: NOT AT ALL
SUM OF ALL RESPONSES TO PHQ9 QUESTIONS 1 AND 2: 0
1. LITTLE INTEREST OR PLEASURE IN DOING THINGS: NOT AT ALL

## 2025-03-05 NOTE — PROGRESS NOTES
Date of Visit: 03/05/2025     Chief Complaint:   Chief Complaint   Patient presents with    Follow-up     3 month    ACP: she does not have anyone to make decisions for her if she can not.  Son with schizophrenia  Son with addiction issues   Grandson#1 on dialysis  Grandson#2 not reliable    HPI:  HPI  Subjective:   Alejandra Nelson is a 84 y.o. female presents   Sometimes can't feel the tips of her fingers when she is trying to pick some up or write.   Both hands   No decrease in strength.  No falls, accident, or injury       ROS:   Review of Systems   Neurological:  Positive for numbness.         Outpatient Medications:  Current Outpatient Medications   Medication Instructions    acetaminophen (Tylenol) 500 mg tablet     allopurinol (ZYLOPRIM) 150 mg, oral, Daily    alpha tocopherol (Vitamin E) 268 mg (400 unit) capsule take by Oral route  every day    amLODIPine (NORVASC) 5 mg, oral, Daily    aspirin 81 mg EC tablet 1 tablet, Daily    atenolol (TENORMIN) 25 mg, oral, Daily    cholecalciferol (Vitamin D-3) 50 MCG (2000 UT) tablet Vitamin D3 50 MCG (2000 UT) Oral Tablet   Refills: 0        Start : 28-May-2021   Active    furosemide (LASIX) 40 mg, oral, Daily    gabapentin (Neurontin) 100 mg capsule TAKE 2 CAPSULES BY MOUTH IN THE  MORNING 2 CAPSULES BY MOUTH IN  THE AFTERNOON AND 3 CAPSULES BY  MOUTH AT BEDTIME MAY ADD AN  ADDITION PILL TO EACH DOSE    levothyroxine (Synthroid, Levoxyl) 125 mcg tablet TAKE 1 TABLET BY MOUTH ONCE  DAILY IN THE MORNING TAKE BEFORE MEAL    lubricating eye drops (carboxymethylcellulose PF) ophthalmic solution     MULTIVITAMIN ORAL     omega 3-dha-epa-fish oil (Fish OiL) 1,000 mg (120 mg-180 mg) capsule     prednisoLONE acetate (Pred-Forte) 1 % ophthalmic suspension instill 1 drop by ophthalmic route 2 times every day into affected eye(s)    timolol (Timoptic) 0.5 % ophthalmic solution instill 1 drop by ophthalmic route 2 times every day into affected eye(s)    ZINC ORAL         Allergies:  Allergies   Allergen Reactions    Penicillins Hives     Hives    Sulfa (Sulfonamide Antibiotics) Rash, Anaphylaxis and Hives    Brimonidine Unknown    Cream Base No.78 (Bulk) Unknown    Ketorolac Unknown    Naproxen Hives     Hives    Nitrofurantoin Hives    Nitrofurantoin Macrocrystal Unknown       Past Medical History:   Diagnosis Date    Disorder of thyroid, unspecified     Thyroid trouble    Personal history of diseases of the blood and blood-forming organs and certain disorders involving the immune mechanism     History of bleeding disorder    Personal history of other diseases of the nervous system and sense organs 05/28/2021    History of glaucoma        Health Maintenance   Topic Date Due    Medicare Annual Wellness Visit (AWV)  Never done    CKD: Urine Protein Screening  05/31/2024    TSH Level  12/04/2025    Diabetes: Hemoglobin A1C  12/04/2025    Lipid Panel  12/04/2029    DTaP/Tdap/Td Vaccines (3 - Td or Tdap) 02/19/2035    RSV High Risk: (Elderly (60+) or Pregnant Population)  Completed    Influenza Vaccine  Completed    Pneumococcal Vaccine  Completed    Zoster Vaccines  Completed    Bone Density Scan  Completed    COVID-19 Vaccine  Completed    HIB Vaccines  Aged Out    Hepatitis B Vaccines  Aged Out    IPV Vaccines  Aged Out    Hepatitis A Vaccines  Aged Out    Meningococcal Vaccine  Aged Out    Rotavirus Vaccines  Aged Out    HPV Vaccines  Aged Out       Past Surgical History:   Procedure Laterality Date    OTHER SURGICAL HISTORY  05/28/2021    Partial hysterectomy    OTHER SURGICAL HISTORY  05/28/2021    Trabeculectomy    OTHER SURGICAL HISTORY  05/28/2021    Eye surgery       Family History   Problem Relation Name Age of Onset    Kidney disease Mother      Kidney disease Brother           Social History     Socioeconomic History    Marital status:    Tobacco Use    Smoking status: Never    Smokeless tobacco: Never          Vitals:  /74 (BP Location: Left arm, Patient  "Position: Sitting, BP Cuff Size: Adult)   Pulse 64   Temp 36.8 °C (98.2 °F) (Temporal)   Resp 16   Ht 1.753 m (5' 9\")   Wt 106 kg (233 lb 6.4 oz)   SpO2 97%   BMI 34.47 kg/m²   Wt Readings from Last 3 Encounters:   03/05/25 106 kg (233 lb 6.4 oz)   01/06/25 109 kg (240 lb)   12/03/24 106 kg (234 lb)     BP Readings from Last 3 Encounters:   03/05/25 134/74   01/06/25 140/90   12/03/24 130/60        Physical Exam:  Physical Exam  Vitals reviewed.   Constitutional:       Appearance: Normal appearance.   HENT:      Head: Normocephalic and atraumatic.   Cardiovascular:      Rate and Rhythm: Normal rate and regular rhythm.      Heart sounds: Normal heart sounds, S1 normal and S2 normal. No murmur heard.  Pulmonary:      Effort: Pulmonary effort is normal.      Breath sounds: Normal breath sounds. No wheezing, rhonchi or rales.   Abdominal:      General: Bowel sounds are normal.      Palpations: Abdomen is soft.   Skin:     General: Skin is warm and dry.   Neurological:      General: No focal deficit present.      Mental Status: She is alert and oriented to person, place, and time.          Assessment/Plan   Diagnoses and all orders for this visit:  Radiculopathy affecting upper extremity  -     EMG & nerve conduction; Future      Orders:  No orders of the defined types were placed in this encounter.       Followup Appts:  No future appointments.      ncs for tingling in fingers hx of cervical stenosis  ____________________________________________________________  Jhony De La Rosa MD  "

## 2025-03-29 PROBLEM — I10 ESSENTIAL (PRIMARY) HYPERTENSION: Status: ACTIVE | Noted: 2025-03-29

## 2025-03-29 PROBLEM — M54.10 RADICULOPATHY AFFECTING UPPER EXTREMITY: Status: ACTIVE | Noted: 2025-03-29

## 2025-03-29 RX ORDER — AMLODIPINE BESYLATE 5 MG/1
5 TABLET ORAL DAILY
Qty: 100 TABLET | Refills: 1 | Status: SHIPPED | OUTPATIENT
Start: 2025-03-29

## 2025-03-29 RX ORDER — LEVOTHYROXINE SODIUM 125 UG/1
125 TABLET ORAL DAILY
Qty: 100 TABLET | Refills: 1 | Status: SHIPPED | OUTPATIENT
Start: 2025-03-29

## 2025-03-29 RX ORDER — ALLOPURINOL 100 MG/1
150 TABLET ORAL DAILY
Qty: 150 TABLET | Refills: 1 | Status: SHIPPED | OUTPATIENT
Start: 2025-03-29

## 2025-03-29 RX ORDER — FUROSEMIDE 40 MG/1
40 TABLET ORAL DAILY
Qty: 100 TABLET | Refills: 1 | Status: SHIPPED | OUTPATIENT
Start: 2025-03-29

## 2025-03-29 RX ORDER — ATENOLOL 25 MG/1
25 TABLET ORAL DAILY
Qty: 100 TABLET | Refills: 1 | Status: SHIPPED | OUTPATIENT
Start: 2025-03-29

## 2025-03-29 ASSESSMENT — ENCOUNTER SYMPTOMS: NUMBNESS: 1

## 2025-04-02 ENCOUNTER — APPOINTMENT (OUTPATIENT)
Dept: PRIMARY CARE | Facility: CLINIC | Age: 85
End: 2025-04-02
Payer: MEDICARE

## 2025-04-14 DIAGNOSIS — M48.00 SPINAL STENOSIS, UNSPECIFIED SPINAL REGION: ICD-10-CM

## 2025-04-15 RX ORDER — GABAPENTIN 100 MG/1
CAPSULE ORAL
Qty: 900 CAPSULE | Refills: 1 | Status: SHIPPED | OUTPATIENT
Start: 2025-04-15

## 2025-04-28 ENCOUNTER — HOSPITAL ENCOUNTER (OUTPATIENT)
Dept: NEUROLOGY | Facility: HOSPITAL | Age: 85
Discharge: HOME | End: 2025-04-28
Payer: MEDICARE

## 2025-04-28 DIAGNOSIS — M54.10 RADICULOPATHY AFFECTING UPPER EXTREMITY: ICD-10-CM

## 2025-04-28 PROCEDURE — 95886 MUSC TEST DONE W/N TEST COMP: CPT

## 2025-05-15 ENCOUNTER — APPOINTMENT (OUTPATIENT)
Dept: PRIMARY CARE | Facility: CLINIC | Age: 85
End: 2025-05-15
Payer: MEDICARE

## 2025-05-15 VITALS
DIASTOLIC BLOOD PRESSURE: 80 MMHG | OXYGEN SATURATION: 98 % | BODY MASS INDEX: 34.07 KG/M2 | WEIGHT: 230 LBS | HEART RATE: 69 BPM | SYSTOLIC BLOOD PRESSURE: 128 MMHG | HEIGHT: 69 IN

## 2025-05-15 DIAGNOSIS — I10 ESSENTIAL (PRIMARY) HYPERTENSION: ICD-10-CM

## 2025-05-15 DIAGNOSIS — Z00.00 ANNUAL PHYSICAL EXAM: ICD-10-CM

## 2025-05-15 DIAGNOSIS — E03.9 HYPOTHYROIDISM, UNSPECIFIED TYPE: ICD-10-CM

## 2025-05-15 DIAGNOSIS — M10.9 GOUT, UNSPECIFIED CAUSE, UNSPECIFIED CHRONICITY, UNSPECIFIED SITE: ICD-10-CM

## 2025-05-15 DIAGNOSIS — N18.5 CKD (CHRONIC KIDNEY DISEASE) STAGE 5, GFR LESS THAN 15 ML/MIN (MULTI): ICD-10-CM

## 2025-05-15 DIAGNOSIS — E55.9 VITAMIN D DEFICIENCY: ICD-10-CM

## 2025-05-15 DIAGNOSIS — M48.02 SPINAL STENOSIS, CERVICAL REGION: ICD-10-CM

## 2025-05-15 DIAGNOSIS — D64.9 ANEMIA, UNSPECIFIED TYPE: ICD-10-CM

## 2025-05-15 DIAGNOSIS — M17.0 BILATERAL PRIMARY OSTEOARTHRITIS OF KNEE: ICD-10-CM

## 2025-05-15 DIAGNOSIS — Z00.00 ENCOUNTER FOR MEDICARE ANNUAL WELLNESS EXAM: Primary | ICD-10-CM

## 2025-05-15 PROCEDURE — 99397 PER PM REEVAL EST PAT 65+ YR: CPT | Performed by: INTERNAL MEDICINE

## 2025-05-15 PROCEDURE — 1159F MED LIST DOCD IN RCRD: CPT | Performed by: INTERNAL MEDICINE

## 2025-05-15 PROCEDURE — 1170F FXNL STATUS ASSESSED: CPT | Performed by: INTERNAL MEDICINE

## 2025-05-15 PROCEDURE — G0439 PPPS, SUBSEQ VISIT: HCPCS | Performed by: INTERNAL MEDICINE

## 2025-05-15 PROCEDURE — 3074F SYST BP LT 130 MM HG: CPT | Performed by: INTERNAL MEDICINE

## 2025-05-15 PROCEDURE — G0444 DEPRESSION SCREEN ANNUAL: HCPCS | Performed by: INTERNAL MEDICINE

## 2025-05-15 PROCEDURE — 99214 OFFICE O/P EST MOD 30 MIN: CPT | Performed by: INTERNAL MEDICINE

## 2025-05-15 PROCEDURE — 1158F ADVNC CARE PLAN TLK DOCD: CPT | Performed by: INTERNAL MEDICINE

## 2025-05-15 PROCEDURE — 3079F DIAST BP 80-89 MM HG: CPT | Performed by: INTERNAL MEDICINE

## 2025-05-15 PROCEDURE — 1123F ACP DISCUSS/DSCN MKR DOCD: CPT | Performed by: INTERNAL MEDICINE

## 2025-05-15 ASSESSMENT — ACTIVITIES OF DAILY LIVING (ADL)
EATING: INDEPENDENT
USING TRANSPORTATION: INDEPENDENT
GROOMING: INDEPENDENT
GROCERY_SHOPPING: INDEPENDENT
TOILETING: INDEPENDENT
TAKING_MEDICATION: INDEPENDENT
MANAGING_FINANCES: INDEPENDENT
STIL DRIVING: YES
DRESSING: INDEPENDENT
USING TELEPHONE: INDEPENDENT
DOING_HOUSEWORK: INDEPENDENT
PREPARING MEALS: INDEPENDENT
BATHING: INDEPENDENT
FEEDING YOURSELF: INDEPENDENT
PATIENT'S MEMORY ADEQUATE TO SAFELY COMPLETE DAILY ACTIVITIES?: YES
ADEQUATE_TO_COMPLETE_ADL: YES
NEEDS ASSISTANCE WITH FOOD: INDEPENDENT
JUDGMENT_ADEQUATE_SAFELY_COMPLETE_DAILY_ACTIVITIES: YES

## 2025-05-15 ASSESSMENT — PATIENT HEALTH QUESTIONNAIRE - PHQ9
SUM OF ALL RESPONSES TO PHQ9 QUESTIONS 1 AND 2: 0
2. FEELING DOWN, DEPRESSED OR HOPELESS: NOT AT ALL
1. LITTLE INTEREST OR PLEASURE IN DOING THINGS: NOT AT ALL

## 2025-05-15 NOTE — PROGRESS NOTES
Date of Visit: 05/15/2025    Chief Complaint:  Chief Complaint   Patient presents with    Medicare Annual Wellness Visit Subsequent       Advance Care Planning  matt roman (oldest grandson)      HPI:  HPI   Subjective:  Patient Alejandra Nelson is a 85 y.o. female  that presents for Medicare Wellness  Overall, she has been well.   C/o pain in her hands.  Ncs-->Residual carpal tunnel syndrome bilat.   Right   Uses Can,  rolator    ROS:  Review of Systems   Constitutional:  Negative for activity change, chills, fatigue, fever and unexpected weight change.   HENT:  Negative for congestion, dental problem, ear pain, sinus pressure, sinus pain, sore throat and trouble swallowing.    Eyes:  Negative for photophobia, discharge, redness and visual disturbance.   Respiratory:  Negative for cough, chest tightness, shortness of breath and wheezing.    Cardiovascular:  Negative for chest pain, palpitations and leg swelling.   Gastrointestinal:  Negative for abdominal pain, blood in stool, constipation, diarrhea, nausea and vomiting.   Endocrine: Negative for cold intolerance, heat intolerance, polydipsia, polyphagia and polyuria.   Genitourinary:  Negative for difficulty urinating, dysuria, flank pain, frequency and urgency.   Musculoskeletal:  Negative for arthralgias, joint swelling and myalgias.   Skin:  Negative for color change and rash.   Allergic/Immunologic: Negative for environmental allergies, food allergies and immunocompromised state.   Neurological:  Negative for dizziness, weakness, light-headedness, numbness and headaches.   Hematological:  Does not bruise/bleed easily.   Psychiatric/Behavioral:  Negative for dysphoric mood and sleep disturbance. The patient is not nervous/anxious.        Medications:  Current Outpatient Medications   Medication Instructions    acetaminophen (Tylenol) 500 mg tablet     allopurinol (ZYLOPRIM) 150 mg, oral, Daily    alpha tocopherol (Vitamin E) 268 mg (400 unit) capsule take  by Oral route  every day    amLODIPine (NORVASC) 5 mg, oral, Daily    aspirin 81 mg EC tablet 1 tablet, Daily    atenolol (TENORMIN) 25 mg, oral, Daily    cholecalciferol (Vitamin D-3) 50 MCG (2000 UT) tablet Vitamin D3 50 MCG (2000 UT) Oral Tablet   Refills: 0        Start : 28-May-2021   Active    furosemide (LASIX) 40 mg, oral, Daily    gabapentin (Neurontin) 100 mg capsule TAKE 2 CAPSULES BY MOUTH IN THE  MORNING 2 CAPSULES IN THE  AFTERNOON AND 3 CAPSULES AT  BEDTIME MAY ADD AN ADDITION  CAPSULE TO EACH DOSE    levothyroxine (SYNTHROID, LEVOXYL) 125 mcg, oral, Daily    lubricating eye drops (carboxymethylcellulose PF) ophthalmic solution     MULTIVITAMIN ORAL     omega 3-dha-epa-fish oil (Fish OiL) 1,000 mg (120 mg-180 mg) capsule     prednisoLONE acetate (Pred-Forte) 1 % ophthalmic suspension instill 1 drop by ophthalmic route 2 times every day into affected eye(s)    timolol (Timoptic) 0.5 % ophthalmic solution instill 1 drop by ophthalmic route 2 times every day into affected eye(s)    ZINC ORAL        Allergies:  RX Allergies[1]    Medical History[2]     Health Maintenance   Topic Date Due    Echocardiogram  Never done    Medicare Annual Wellness Visit (AWV)  Never done    CKD: Urine Protein Screening  05/31/2024    COVID-19 Vaccine (6 - 2024-25 season) 03/23/2025    TSH Level  12/04/2025    Diabetes: Hemoglobin A1C  12/04/2025    Creatinine Level  01/06/2026    Potassium Level  01/06/2026    Lipid Panel  12/04/2029    DTaP/Tdap/Td Vaccines (3 - Td or Tdap) 02/19/2035    RSV High Risk: (Elderly (60+) or Pregnant Population)  Completed    Influenza Vaccine  Completed    Pneumococcal Vaccine  Completed    Zoster Vaccines  Completed    Bone Density Scan  Completed    HIB Vaccines  Aged Out    Hepatitis B Vaccines  Aged Out    IPV Vaccines  Aged Out    Hepatitis A Vaccines  Aged Out    Meningococcal Vaccine  Aged Out    Rotavirus Vaccines  Aged Out    HPV Vaccines  Aged Out        Immunization History  "  Administered Date(s) Administered    COVID-19, mRNA, LNP-S, PF, 30 mcg/0.3 mL dose 01/23/2021, 02/20/2021, 12/29/2021    Flu vaccine, quadrivalent, high-dose, preservative free, age 65y+ (FLUZONE) 10/21/2016, 09/13/2018, 09/30/2019, 09/10/2020, 09/20/2021, 11/06/2023    Flu vaccine, trivalent, preservative free, HIGH-DOSE, age 65y+ (Fluzone) 09/23/2024    Influenza Whole 09/29/2015    Influenza, seasonal, injectable 11/19/2012, 11/04/2013    Influenza, trivalent, adjuvanted 11/03/2017    Pfizer COVID-19 vaccine, 12 years and older, (30mcg/0.3mL) (Comirnaty) 10/18/2023, 09/23/2024    Pfizer COVID-19 vaccine, bivalent, age 12 years and older (30 mcg/0.3 mL) 12/01/2022    Pfizer Gray Cap SARS-CoV-2 04/12/2022    Pneumococcal conjugate vaccine, 13-valent (PREVNAR 13) 09/29/2015    Pneumococcal conjugate vaccine, 20-valent (PREVNAR 20) 05/18/2023    Pneumococcal polysaccharide vaccine, 23-valent, age 2 years and older (PNEUMOVAX 23) 06/04/2003, 02/01/2021    RESPIRATORY SYNCYTIAL VIRUS (RSV), ELIGIBLE PREGNANT PTS, 0.5 ML (ABRYSVO) 02/19/2025    Td vaccine, age 7 years and older (TENIVAC) 07/08/1998    Tdap vaccine, age 7 year and older (BOOSTRIX, ADACEL) 04/20/2007, 02/19/2025    Zoster vaccine, recombinant, adult (SHINGRIX) 06/25/2021, 09/20/2021        Surgical History[3]     Family History[4]     Social History[5]     Vitals:  /80 (BP Location: Left arm, Patient Position: Sitting, BP Cuff Size: Adult)   Pulse 69   Ht 1.753 m (5' 9\")   Wt 104 kg (230 lb)   SpO2 98%   BMI 33.97 kg/m²   BP Readings from Last 3 Encounters:   05/15/25 128/80   03/05/25 134/74   01/06/25 140/90      Wt Readings from Last 3 Encounters:   05/15/25 104 kg (230 lb)   03/05/25 106 kg (233 lb 6.4 oz)   01/06/25 109 kg (240 lb)       Physical Exam:  Physical Exam  Constitutional:       General: She is not in acute distress.     Appearance: Normal appearance. She is well-developed and well-groomed.   HENT:      Head: Normocephalic and " atraumatic.      Right Ear: Tympanic membrane and ear canal normal.      Left Ear: Tympanic membrane and ear canal normal.      Nose: Nose normal.      Mouth/Throat:      Mouth: Mucous membranes are moist.      Pharynx: Oropharynx is clear.   Eyes:      Conjunctiva/sclera: Conjunctivae normal.      Pupils: Pupils are equal, round, and reactive to light.   Neck:      Thyroid: No thyromegaly.   Cardiovascular:      Rate and Rhythm: Normal rate and regular rhythm.      Heart sounds: Normal heart sounds, S1 normal and S2 normal. No murmur heard.  Pulmonary:      Effort: Pulmonary effort is normal.      Breath sounds: Normal breath sounds and air entry. No wheezing, rhonchi or rales.   Abdominal:      General: Bowel sounds are normal. There is no distension.      Palpations: Abdomen is soft.      Tenderness: There is no abdominal tenderness. There is no guarding or rebound.   Musculoskeletal:         General: No swelling or tenderness. Normal range of motion.      Cervical back: Normal, full passive range of motion without pain, normal range of motion and neck supple.      Thoracic back: Normal.      Lumbar back: Normal.      Right lower leg: No edema.      Left lower leg: No edema.      Comments: Upper and lower extrems are wnl--inspection and palpation.   Strength is normal and symmetric.   Lymphadenopathy:      Cervical: No cervical adenopathy.   Skin:     General: Skin is warm and dry.      Findings: No bruising, erythema, lesion or rash.      Comments: Intact   Neurological:      General: No focal deficit present.      Mental Status: She is alert and oriented to person, place, and time.      Sensory: Sensation is intact.      Motor: Motor function is intact.      Deep Tendon Reflexes: Reflexes are normal and symmetric.   Psychiatric:         Mood and Affect: Mood and affect normal.         Speech: Speech normal.         Behavior: Behavior normal. Behavior is cooperative.         Assessment/Plan:  Diagnoses and all  orders for this visit:  Encounter for Medicare annual wellness exam  -     CBC and Auto Differential; Future  -     Comprehensive Metabolic Panel; Future  -     Urinalysis with Reflex Microscopic; Future  -     Vitamin D 25-Hydroxy,Total (for eval of Vitamin D levels); Future  -     TSH with reflex to Free T4 if abnormal; Future  Annual physical exam  -     CBC and Auto Differential; Future  -     Urinalysis with Reflex Microscopic; Future  -     Vitamin D 25-Hydroxy,Total (for eval of Vitamin D levels); Future  -     TSH with reflex to Free T4 if abnormal; Future  Anemia, unspecified type  -     CBC and Auto Differential; Future  Vitamin D deficiency  -     Vitamin D 25-Hydroxy,Total (for eval of Vitamin D levels); Future  Hypothyroidism, unspecified type  -     TSH with reflex to Free T4 if abnormal; Future  Essential (primary) hypertension  Gout, unspecified cause, unspecified chronicity, unspecified site      Orders:  No orders of the defined types were placed in this encounter.      Future Appointments:  No future appointments.      Jhony De La Rosa MD       [1]   Allergies  Allergen Reactions    Penicillins Hives     Hives    Sulfa (Sulfonamide Antibiotics) Rash, Anaphylaxis and Hives    Brimonidine Unknown    C[employer] No.78 (Bulk) Unknown    Ketorolac Unknown    Naproxen Hives     Hives    Nitrofurantoin Hives    Nitrofurantoin Macrocrystal Unknown   [2]   Past Medical History:  Diagnosis Date    Disorder of thyroid, unspecified     Thyroid trouble    Personal history of diseases of the blood and blood-forming organs and certain disorders involving the immune mechanism     History of bleeding disorder    Personal history of other diseases of the nervous system and sense organs 05/28/2021    History of glaucoma   [3]   Past Surgical History:  Procedure Laterality Date    OTHER SURGICAL HISTORY  05/28/2021    Partial hysterectomy    OTHER SURGICAL HISTORY  05/28/2021    Trabeculectomy    OTHER SURGICAL HISTORY   05/28/2021    Eye surgery   [4]   Family History  Problem Relation Name Age of Onset    Kidney disease Mother      Kidney disease Brother      Schizophrenia Son      Other (drug addiction) Son      End stage renal disease (Multi) Grandson      No Known Problems Grandson     [5]   Social History  Socioeconomic History    Marital status:    Tobacco Use    Smoking status: Never    Smokeless tobacco: Never

## 2025-05-16 LAB
25(OH)D3+25(OH)D2 SERPL-MCNC: 38 NG/ML (ref 30–100)
ALBUMIN SERPL-MCNC: 3.7 G/DL (ref 3.6–5.1)
ALP SERPL-CCNC: 76 U/L (ref 37–153)
ALT SERPL-CCNC: 9 U/L (ref 6–29)
ANION GAP SERPL CALCULATED.4IONS-SCNC: 8 MMOL/L (CALC) (ref 7–17)
APPEARANCE UR: CLEAR
AST SERPL-CCNC: 18 U/L (ref 10–35)
BACTERIA #/AREA URNS HPF: ABNORMAL /HPF
BASOPHILS # BLD AUTO: 50 CELLS/UL (ref 0–200)
BASOPHILS NFR BLD AUTO: 0.6 %
BILIRUB SERPL-MCNC: 0.4 MG/DL (ref 0.2–1.2)
BILIRUB UR QL STRIP: NEGATIVE
BUN SERPL-MCNC: 45 MG/DL (ref 7–25)
CALCIUM SERPL-MCNC: 9.4 MG/DL (ref 8.6–10.4)
CHLORIDE SERPL-SCNC: 102 MMOL/L (ref 98–110)
CO2 SERPL-SCNC: 27 MMOL/L (ref 20–32)
COLOR UR: YELLOW
CREAT SERPL-MCNC: 3.19 MG/DL (ref 0.6–0.95)
EGFRCR SERPLBLD CKD-EPI 2021: 14 ML/MIN/1.73M2
EOSINOPHIL # BLD AUTO: 183 CELLS/UL (ref 15–500)
EOSINOPHIL NFR BLD AUTO: 2.2 %
ERYTHROCYTE [DISTWIDTH] IN BLOOD BY AUTOMATED COUNT: 14.5 % (ref 11–15)
GLUCOSE SERPL-MCNC: 98 MG/DL (ref 65–99)
GLUCOSE UR QL STRIP: NEGATIVE
HCT VFR BLD AUTO: 34.2 % (ref 35–45)
HGB BLD-MCNC: 11.1 G/DL (ref 11.7–15.5)
HGB UR QL STRIP: NEGATIVE
HYALINE CASTS #/AREA URNS LPF: ABNORMAL /LPF
KETONES UR QL STRIP: NEGATIVE
LEUKOCYTE ESTERASE UR QL STRIP: ABNORMAL
LYMPHOCYTES # BLD AUTO: 2573 CELLS/UL (ref 850–3900)
LYMPHOCYTES NFR BLD AUTO: 31 %
MCH RBC QN AUTO: 32.3 PG (ref 27–33)
MCHC RBC AUTO-ENTMCNC: 32.5 G/DL (ref 32–36)
MCV RBC AUTO: 99.4 FL (ref 80–100)
MONOCYTES # BLD AUTO: 813 CELLS/UL (ref 200–950)
MONOCYTES NFR BLD AUTO: 9.8 %
NEUTROPHILS # BLD AUTO: 4681 CELLS/UL (ref 1500–7800)
NEUTROPHILS NFR BLD AUTO: 56.4 %
NITRITE UR QL STRIP: NEGATIVE
PH UR STRIP: 7 [PH] (ref 5–8)
PLATELET # BLD AUTO: 177 THOUSAND/UL (ref 140–400)
PMV BLD REES-ECKER: 12.5 FL (ref 7.5–12.5)
POTASSIUM SERPL-SCNC: 4.8 MMOL/L (ref 3.5–5.3)
PROT SERPL-MCNC: 7.2 G/DL (ref 6.1–8.1)
PROT UR QL STRIP: ABNORMAL
RBC # BLD AUTO: 3.44 MILLION/UL (ref 3.8–5.1)
RBC #/AREA URNS HPF: ABNORMAL /HPF
SERVICE CMNT-IMP: ABNORMAL
SODIUM SERPL-SCNC: 137 MMOL/L (ref 135–146)
SP GR UR STRIP: 1.01 (ref 1–1.03)
SQUAMOUS #/AREA URNS HPF: ABNORMAL /HPF
T4 FREE SERPL-MCNC: 1.3 NG/DL (ref 0.8–1.8)
TSH SERPL-ACNC: 5.31 MIU/L (ref 0.4–4.5)
WBC # BLD AUTO: 8.3 THOUSAND/UL (ref 3.8–10.8)
WBC #/AREA URNS HPF: ABNORMAL /HPF

## 2025-05-28 PROBLEM — Z00.00 ENCOUNTER FOR MEDICARE ANNUAL WELLNESS EXAM: Status: ACTIVE | Noted: 2025-05-28

## 2025-05-28 PROBLEM — D64.9 ANEMIA: Status: ACTIVE | Noted: 2025-05-28

## 2025-05-28 ASSESSMENT — ENCOUNTER SYMPTOMS
CONSTIPATION: 0
EYE DISCHARGE: 0
FATIGUE: 0
COLOR CHANGE: 0
FEVER: 0
CHILLS: 0
EYE REDNESS: 0
ABDOMINAL PAIN: 0
WHEEZING: 0
JOINT SWELLING: 0
DIARRHEA: 0
DEPRESSION: 0
BLOOD IN STOOL: 0
PHOTOPHOBIA: 0
SINUS PAIN: 0
DYSPHORIC MOOD: 0
POLYPHAGIA: 0
DYSURIA: 0
NAUSEA: 0
DIZZINESS: 0
SLEEP DISTURBANCE: 0
FREQUENCY: 0
UNEXPECTED WEIGHT CHANGE: 0
NUMBNESS: 0
SHORTNESS OF BREATH: 0
LIGHT-HEADEDNESS: 0
LOSS OF SENSATION IN FEET: 0
CHEST TIGHTNESS: 0
MYALGIAS: 0
ACTIVITY CHANGE: 0
OCCASIONAL FEELINGS OF UNSTEADINESS: 0
WEAKNESS: 0
SINUS PRESSURE: 0
FLANK PAIN: 0
SORE THROAT: 0
DIFFICULTY URINATING: 0
ARTHRALGIAS: 0
PALPITATIONS: 0
TROUBLE SWALLOWING: 0
NERVOUS/ANXIOUS: 0
VOMITING: 0
POLYDIPSIA: 0
COUGH: 0
BRUISES/BLEEDS EASILY: 0
HEADACHES: 0

## 2025-08-16 DIAGNOSIS — M48.00 SPINAL STENOSIS, UNSPECIFIED SPINAL REGION: ICD-10-CM

## 2025-08-16 DIAGNOSIS — M10.9 GOUT, UNSPECIFIED CAUSE, UNSPECIFIED CHRONICITY, UNSPECIFIED SITE: ICD-10-CM

## 2025-08-16 DIAGNOSIS — E03.9 HYPOTHYROIDISM, UNSPECIFIED TYPE: ICD-10-CM

## 2025-08-16 DIAGNOSIS — I10 ESSENTIAL (PRIMARY) HYPERTENSION: ICD-10-CM

## 2025-08-18 ENCOUNTER — TELEPHONE (OUTPATIENT)
Dept: PRIMARY CARE | Facility: CLINIC | Age: 85
End: 2025-08-18
Payer: MEDICARE

## 2025-08-20 RX ORDER — ATENOLOL 25 MG/1
25 TABLET ORAL DAILY
Qty: 100 TABLET | Refills: 1 | Status: SHIPPED | OUTPATIENT
Start: 2025-08-20

## 2025-08-20 RX ORDER — FUROSEMIDE 40 MG/1
40 TABLET ORAL DAILY
Qty: 100 TABLET | Refills: 1 | Status: SHIPPED | OUTPATIENT
Start: 2025-08-20 | End: 2025-08-21 | Stop reason: WASHOUT

## 2025-08-20 RX ORDER — LEVOTHYROXINE SODIUM 125 UG/1
125 TABLET ORAL DAILY
Qty: 100 TABLET | Refills: 1 | Status: SHIPPED | OUTPATIENT
Start: 2025-08-20

## 2025-08-20 RX ORDER — GABAPENTIN 100 MG/1
CAPSULE ORAL
Qty: 1000 CAPSULE | Refills: 1 | Status: SHIPPED | OUTPATIENT
Start: 2025-08-20

## 2025-08-20 RX ORDER — AMLODIPINE BESYLATE 5 MG/1
5 TABLET ORAL DAILY
Qty: 100 TABLET | Refills: 1 | Status: SHIPPED | OUTPATIENT
Start: 2025-08-20 | End: 2025-08-21 | Stop reason: WASHOUT

## 2025-08-20 RX ORDER — ALLOPURINOL 100 MG/1
150 TABLET ORAL DAILY
Qty: 150 TABLET | Refills: 1 | Status: SHIPPED | OUTPATIENT
Start: 2025-08-20

## 2025-08-21 DIAGNOSIS — N18.4 CKD (CHRONIC KIDNEY DISEASE) STAGE 4, GFR 15-29 ML/MIN (MULTI): Primary | ICD-10-CM

## 2025-08-21 RX ORDER — HYDRALAZINE HYDROCHLORIDE 10 MG/1
10 TABLET, FILM COATED ORAL 2 TIMES DAILY
COMMUNITY
Start: 2025-05-28 | End: 2025-08-21 | Stop reason: SDUPTHER

## 2025-08-21 RX ORDER — FUROSEMIDE 20 MG/1
20 TABLET ORAL DAILY
Qty: 90 TABLET | Refills: 1 | Status: SHIPPED | OUTPATIENT
Start: 2025-08-21

## 2025-08-21 RX ORDER — HYDRALAZINE HYDROCHLORIDE 10 MG/1
10 TABLET, FILM COATED ORAL 2 TIMES DAILY
Qty: 180 TABLET | Refills: 1 | Status: SHIPPED | OUTPATIENT
Start: 2025-08-21

## 2025-08-29 DIAGNOSIS — I10 ESSENTIAL (PRIMARY) HYPERTENSION: ICD-10-CM

## 2025-08-29 DIAGNOSIS — N19 RENAL FAILURE, UNSPECIFIED CHRONICITY: Primary | ICD-10-CM
